# Patient Record
Sex: MALE | Race: ASIAN | NOT HISPANIC OR LATINO | Employment: OTHER
[De-identification: names, ages, dates, MRNs, and addresses within clinical notes are randomized per-mention and may not be internally consistent; named-entity substitution may affect disease eponyms.]

---

## 2017-02-03 ENCOUNTER — PRIOR ORIGINAL RECORDS (OUTPATIENT)
Dept: OTHER | Age: 72
End: 2017-02-03

## 2017-03-07 ENCOUNTER — HOSPITAL ENCOUNTER (OUTPATIENT)
Dept: CV DIAGNOSTICS | Facility: HOSPITAL | Age: 72
Discharge: HOME | End: 2017-03-07
Attending: INTERNAL MEDICINE
Payer: MEDICARE

## 2017-03-07 DIAGNOSIS — I35.0: ICD-10-CM

## 2017-03-07 DIAGNOSIS — R94.31 ABNORMAL EKG: ICD-10-CM

## 2017-03-07 PROCEDURE — 93306 TTE W/DOPPLER COMPLETE: CPT

## 2017-03-07 PROCEDURE — 93306 TTE W/DOPPLER COMPLETE: CPT | Performed by: INTERNAL MEDICINE

## 2017-03-08 ENCOUNTER — PRIOR ORIGINAL RECORDS (OUTPATIENT)
Dept: OTHER | Age: 72
End: 2017-03-08

## 2017-03-13 ENCOUNTER — PRIOR ORIGINAL RECORDS (OUTPATIENT)
Dept: OTHER | Age: 72
End: 2017-03-13

## 2017-03-14 ENCOUNTER — PRIOR ORIGINAL RECORDS (OUTPATIENT)
Dept: OTHER | Age: 72
End: 2017-03-14

## 2017-05-30 ENCOUNTER — LAB ENCOUNTER (OUTPATIENT)
Dept: LAB | Age: 72
End: 2017-05-30
Attending: GENERAL PRACTICE
Payer: MEDICARE

## 2017-05-30 ENCOUNTER — PRIOR ORIGINAL RECORDS (OUTPATIENT)
Dept: OTHER | Age: 72
End: 2017-05-30

## 2017-05-30 DIAGNOSIS — I10 HYPERTENSION: Primary | ICD-10-CM

## 2017-05-30 DIAGNOSIS — E78.5 DYSLIPIDEMIA: ICD-10-CM

## 2017-05-30 PROCEDURE — 36415 COLL VENOUS BLD VENIPUNCTURE: CPT

## 2017-05-30 PROCEDURE — 80061 LIPID PANEL: CPT

## 2017-05-30 PROCEDURE — 80053 COMPREHEN METABOLIC PANEL: CPT

## 2017-07-12 ENCOUNTER — HOSPITAL ENCOUNTER (OUTPATIENT)
Dept: NUCLEAR MEDICINE | Facility: HOSPITAL | Age: 72
Discharge: HOME | End: 2017-07-12
Attending: INTERNAL MEDICINE
Payer: MEDICARE

## 2017-07-12 ENCOUNTER — PRIOR ORIGINAL RECORDS (OUTPATIENT)
Dept: OTHER | Age: 72
End: 2017-07-12

## 2017-07-12 ENCOUNTER — HOSPITAL ENCOUNTER (OUTPATIENT)
Dept: CV DIAGNOSTICS | Facility: HOSPITAL | Age: 72
Discharge: HOME | End: 2017-07-12
Attending: INTERNAL MEDICINE
Payer: MEDICARE

## 2017-07-12 DIAGNOSIS — R07.9 CHEST PAIN, UNSPECIFIED TYPE: ICD-10-CM

## 2017-07-12 PROCEDURE — 93017 CV STRESS TEST TRACING ONLY: CPT | Performed by: INTERNAL MEDICINE

## 2017-07-12 PROCEDURE — 93018 CV STRESS TEST I&R ONLY: CPT | Performed by: INTERNAL MEDICINE

## 2017-07-12 PROCEDURE — 78452 HT MUSCLE IMAGE SPECT MULT: CPT | Performed by: INTERNAL MEDICINE

## 2017-07-12 PROCEDURE — 93016 CV STRESS TEST SUPVJ ONLY: CPT | Performed by: INTERNAL MEDICINE

## 2017-07-12 RX ORDER — 0.9 % SODIUM CHLORIDE 0.9 %
VIAL (ML) INJECTION
Status: COMPLETED
Start: 2017-07-12 | End: 2017-07-12

## 2017-07-12 RX ADMIN — 0.9 % SODIUM CHLORIDE 10 ML: 0.9 % VIAL (ML) INJECTION at 09:27:00

## 2017-07-12 NOTE — IMAGING NOTE
Patient here in cardiac diagnostics, outpatient lexiscan nuclear stress. Patient asymptomatic during test, no chest pain. Remains asymptomatic. Nuclear wet read requested due to ECG.  Per Dr. Ting Alvarez, nuclear images demonstrate small fixed apical perfusion d

## 2017-07-13 LAB
ALBUMIN: 3.6 G/DL
ALKALINE PHOSPHATATE(ALK PHOS): 47 IU/L
ALT (SGPT): 11 U/L
AST (SGOT): 18 U/L
BILIRUBIN TOTAL: 0.7 MG/DL
BUN: 14 MG/DL
CALCIUM: 9.1 MG/DL
CHLORIDE: 106 MEQ/L
CHOLESTEROL, TOTAL: 149 MG/DL
CREATININE, SERUM: 1.09 MG/DL
GLOBULIN: 4 G/DL
GLUCOSE: 94 MG/DL
GLUCOSE: 94 MG/DL
HDL CHOLESTEROL: 38 MG/DL
LDL CHOLESTEROL: 89 MG/DL
NON-HDL CHOLESTEROL: 111 MG/DL
POTASSIUM, SERUM: 3.8 MEQ/L
PROTEIN, TOTAL: 7.6 G/DL
SGOT (AST): 18 IU/L
SGPT (ALT): 11 IU/L
SODIUM: 140 MEQ/L
TRIGLYCERIDES: 111 MG/DL

## 2017-07-14 ENCOUNTER — PRIOR ORIGINAL RECORDS (OUTPATIENT)
Dept: OTHER | Age: 72
End: 2017-07-14

## 2017-07-14 ENCOUNTER — MYAURORA ACCOUNT LINK (OUTPATIENT)
Dept: OTHER | Age: 72
End: 2017-07-14

## 2017-09-30 ENCOUNTER — HOSPITAL ENCOUNTER (EMERGENCY)
Facility: HOSPITAL | Age: 72
Discharge: HOME | End: 2017-09-30
Attending: EMERGENCY MEDICINE
Payer: MEDICARE

## 2017-09-30 VITALS
DIASTOLIC BLOOD PRESSURE: 78 MMHG | RESPIRATION RATE: 20 BRPM | BODY MASS INDEX: 21.69 KG/M2 | HEART RATE: 76 BPM | SYSTOLIC BLOOD PRESSURE: 152 MMHG | TEMPERATURE: 98 F | OXYGEN SATURATION: 100 % | WEIGHT: 135 LBS | HEIGHT: 66 IN

## 2017-09-30 DIAGNOSIS — R03.0 ELEVATED BLOOD PRESSURE READING: ICD-10-CM

## 2017-09-30 DIAGNOSIS — Z65.8 SOCIAL DISCORD: Primary | ICD-10-CM

## 2017-09-30 PROCEDURE — 99283 EMERGENCY DEPT VISIT LOW MDM: CPT

## 2017-09-30 NOTE — ED PROVIDER NOTES
Patient Seen in: [facility] AND [facility] Emergency Department    History   Patient presents with:  Eval-P (psychiatric)    Stated Complaint: ALLEGED SUICIDAL     HPI    Relatively healthy 80-year-old male without psychiatric history who presents with EMS after Conjunctivae are normal. Pupils are equal, round, and reactive to light. Neck: Normal range of motion. Neck supple. Cardiovascular: Normal rate, regular rhythm and intact distal pulses. Pulmonary/Chest: Effort normal. No respiratory distress.    Diane Gilliam

## 2017-09-30 NOTE — ED NOTES
Patient states he made the comment as a joke to a  because he is under financial stress . States he would never kill himself. Family on the way. Chloe psych liason made aware of patient. Lunch tray ordered.

## 2018-01-23 ENCOUNTER — TELEPHONE (OUTPATIENT)
Dept: INTERNAL MEDICINE CLINIC | Facility: CLINIC | Age: 73
End: 2018-01-23

## 2018-01-23 NOTE — TELEPHONE ENCOUNTER
Called patient and left a message to call the office to schedule an appointment with Dr. Uriah Durán or Dr. Aileen Meek to establish care to continue getting refills since Dr. Gwendolyn Gold has retired.

## 2018-02-10 ENCOUNTER — LAB ENCOUNTER (OUTPATIENT)
Dept: LAB | Age: 73
End: 2018-02-10
Attending: INTERNAL MEDICINE
Payer: MEDICARE

## 2018-02-10 DIAGNOSIS — R53.83 FATIGUE, UNSPECIFIED TYPE: ICD-10-CM

## 2018-02-10 DIAGNOSIS — Z00.00 ROUTINE GENERAL MEDICAL EXAMINATION AT A HEALTH CARE FACILITY: Primary | ICD-10-CM

## 2018-02-10 DIAGNOSIS — Z12.5 PROSTATE CANCER SCREENING: ICD-10-CM

## 2018-02-10 LAB
ALBUMIN SERPL BCP-MCNC: 3.5 G/DL (ref 3.5–4.8)
ALBUMIN/GLOB SERPL: 0.9 {RATIO} (ref 1–2)
ALP SERPL-CCNC: 49 U/L (ref 32–100)
ALT SERPL-CCNC: 14 U/L (ref 17–63)
ANION GAP SERPL CALC-SCNC: 7 MMOL/L (ref 0–18)
AST SERPL-CCNC: 18 U/L (ref 15–41)
BASOPHILS # BLD: 0.1 K/UL (ref 0–0.2)
BASOPHILS NFR BLD: 1 %
BILIRUB SERPL-MCNC: 0.6 MG/DL (ref 0.3–1.2)
BUN SERPL-MCNC: 7 MG/DL (ref 8–20)
BUN/CREAT SERPL: 7.3 (ref 10–20)
CALCIUM SERPL-MCNC: 9.1 MG/DL (ref 8.5–10.5)
CHLORIDE SERPL-SCNC: 103 MMOL/L (ref 95–110)
CO2 SERPL-SCNC: 27 MMOL/L (ref 22–32)
CREAT SERPL-MCNC: 0.96 MG/DL (ref 0.5–1.5)
EOSINOPHIL # BLD: 0.1 K/UL (ref 0–0.7)
EOSINOPHIL NFR BLD: 1 %
ERYTHROCYTE [DISTWIDTH] IN BLOOD BY AUTOMATED COUNT: 13.8 % (ref 11–15)
GLOBULIN PLAS-MCNC: 4 G/DL (ref 2.5–3.7)
GLUCOSE SERPL-MCNC: 91 MG/DL (ref 70–99)
HCT VFR BLD AUTO: 45.7 % (ref 41–52)
HGB BLD-MCNC: 15 G/DL (ref 13.5–17.5)
LYMPHOCYTES # BLD: 2.4 K/UL (ref 1–4)
LYMPHOCYTES NFR BLD: 25 %
MCH RBC QN AUTO: 26.5 PG (ref 27–32)
MCHC RBC AUTO-ENTMCNC: 32.7 G/DL (ref 32–37)
MCV RBC AUTO: 80.9 FL (ref 80–100)
MONOCYTES # BLD: 0.6 K/UL (ref 0–1)
MONOCYTES NFR BLD: 6 %
NEUTROPHILS # BLD AUTO: 6.2 K/UL (ref 1.8–7.7)
NEUTROPHILS NFR BLD: 66 %
OSMOLALITY UR CALC.SUM OF ELEC: 282 MOSM/KG (ref 275–295)
PATIENT FASTING: YES
PLATELET # BLD AUTO: 256 K/UL (ref 140–400)
PMV BLD AUTO: 9.4 FL (ref 7.4–10.3)
POTASSIUM SERPL-SCNC: 4.5 MMOL/L (ref 3.3–5.1)
PROT SERPL-MCNC: 7.5 G/DL (ref 5.9–8.4)
PSA SERPL-MCNC: 2.9 NG/ML (ref 0–4)
RBC # BLD AUTO: 5.66 M/UL (ref 4.5–5.9)
SODIUM SERPL-SCNC: 137 MMOL/L (ref 136–144)
TSH SERPL-ACNC: 0.9 UIU/ML (ref 0.45–5.33)
WBC # BLD AUTO: 9.3 K/UL (ref 4–11)

## 2018-02-10 PROCEDURE — 84443 ASSAY THYROID STIM HORMONE: CPT

## 2018-02-10 PROCEDURE — 80053 COMPREHEN METABOLIC PANEL: CPT

## 2018-02-10 PROCEDURE — 85025 COMPLETE CBC W/AUTO DIFF WBC: CPT

## 2018-02-10 PROCEDURE — 36415 COLL VENOUS BLD VENIPUNCTURE: CPT

## 2018-03-24 ENCOUNTER — LAB ENCOUNTER (OUTPATIENT)
Dept: LAB | Age: 73
End: 2018-03-24
Attending: INTERNAL MEDICINE
Payer: MEDICARE

## 2018-03-24 DIAGNOSIS — I10 HTN (HYPERTENSION): ICD-10-CM

## 2018-03-24 DIAGNOSIS — E55.9 VITAMIN D DEFICIENCY: Primary | ICD-10-CM

## 2018-03-24 LAB
CHOLEST SERPL-MCNC: 157 MG/DL (ref 110–200)
HDLC SERPL-MCNC: 43 MG/DL
LDLC SERPL CALC-MCNC: 103 MG/DL (ref 0–99)
NONHDLC SERPL-MCNC: 114 MG/DL
TRIGL SERPL-MCNC: 56 MG/DL (ref 1–149)

## 2018-03-24 PROCEDURE — 80061 LIPID PANEL: CPT

## 2018-03-24 PROCEDURE — 36415 COLL VENOUS BLD VENIPUNCTURE: CPT

## 2018-03-24 PROCEDURE — 82306 VITAMIN D 25 HYDROXY: CPT

## 2018-03-26 LAB — 25(OH)D3 SERPL-MCNC: 34.6 NG/ML

## 2018-05-16 ENCOUNTER — APPOINTMENT (OUTPATIENT)
Dept: CT IMAGING | Facility: HOSPITAL | Age: 73
End: 2018-05-16
Attending: EMERGENCY MEDICINE
Payer: MEDICARE

## 2018-05-16 ENCOUNTER — HOSPITAL ENCOUNTER (EMERGENCY)
Facility: HOSPITAL | Age: 73
Discharge: HOME | End: 2018-05-16
Attending: EMERGENCY MEDICINE
Payer: MEDICARE

## 2018-05-16 VITALS
SYSTOLIC BLOOD PRESSURE: 150 MMHG | WEIGHT: 130 LBS | BODY MASS INDEX: 20.89 KG/M2 | HEART RATE: 70 BPM | OXYGEN SATURATION: 100 % | TEMPERATURE: 98 F | HEIGHT: 66 IN | RESPIRATION RATE: 18 BRPM | DIASTOLIC BLOOD PRESSURE: 96 MMHG

## 2018-05-16 DIAGNOSIS — R42 VERTIGO: Primary | ICD-10-CM

## 2018-05-16 PROCEDURE — 80048 BASIC METABOLIC PNL TOTAL CA: CPT | Performed by: EMERGENCY MEDICINE

## 2018-05-16 PROCEDURE — 36415 COLL VENOUS BLD VENIPUNCTURE: CPT

## 2018-05-16 PROCEDURE — 93010 ELECTROCARDIOGRAM REPORT: CPT | Performed by: EMERGENCY MEDICINE

## 2018-05-16 PROCEDURE — 99285 EMERGENCY DEPT VISIT HI MDM: CPT

## 2018-05-16 PROCEDURE — 93005 ELECTROCARDIOGRAM TRACING: CPT

## 2018-05-16 PROCEDURE — 70450 CT HEAD/BRAIN W/O DYE: CPT | Performed by: EMERGENCY MEDICINE

## 2018-05-16 PROCEDURE — 85025 COMPLETE CBC W/AUTO DIFF WBC: CPT | Performed by: EMERGENCY MEDICINE

## 2018-05-16 RX ORDER — MECLIZINE HYDROCHLORIDE 25 MG/1
25 TABLET ORAL 3 TIMES DAILY PRN
Qty: 30 TABLET | Refills: 0 | Status: SHIPPED | OUTPATIENT
Start: 2018-05-16

## 2018-05-16 RX ORDER — METOPROLOL SUCCINATE 25 MG/1
25 TABLET, EXTENDED RELEASE ORAL DAILY
COMMUNITY

## 2018-05-16 RX ORDER — MECLIZINE HYDROCHLORIDE 25 MG/1
25 TABLET ORAL ONCE
Status: COMPLETED | OUTPATIENT
Start: 2018-05-16 | End: 2018-05-16

## 2018-05-16 NOTE — ED PROVIDER NOTES
Patient Seen in: [facility] AND [facility] Emergency Department    History   Patient presents with:  Dizziness (neurologic)    Stated Complaint: dizzy/ palpatations    HPI    58-year-old male presents for complaint of dizziness.   Dizziness is a room spinning sen and well-nourished. HENT:   Head: Normocephalic and atraumatic.    Right Ear: Tympanic membrane normal.   Left Ear: Tympanic membrane normal.   Mouth/Throat: Uvula is midline, oropharynx is clear and moist and mucous membranes are normal.   Eyes: Conjunct 1507  ------------------------------------------------------------      MDM    EKG without any acute ischemic changes. Patient CBC and BMP with a leukocytosis but otherwise unremarkable. Patient is given meclizine. Head CT is pending.   Patient is endors

## 2018-05-16 NOTE — ED PROVIDER NOTES
Received in sign out from Dr. Dina Ng. Discussed with Dr. Jarrett Castaneda, original plan for admission for MRI and neuro consult.   Discussed this with the patient and his son at the bedside, patient does not want to be admitted, is willing to leave AGAINST MEDIC

## 2018-05-16 NOTE — ED NOTES
Pt verbalized understanding of leaving AMA and associated risks. Pt given d/c instructions and verbalized understanding of need to follow up.

## 2018-05-16 NOTE — ED INITIAL ASSESSMENT (HPI)
Pt reports some nausea, one episode of emesis and dizziness when he changes positions. Remote history of vertigo. Denies headache or head trauma.   States, \"I have to get up very slowly, and when I lie down I feel a little something that is not right in

## 2018-06-05 ENCOUNTER — HOSPITAL ENCOUNTER (OUTPATIENT)
Dept: MRI IMAGING | Facility: HOSPITAL | Age: 73
Discharge: HOME | End: 2018-06-05
Attending: INTERNAL MEDICINE
Payer: MEDICARE

## 2018-06-05 DIAGNOSIS — R42 DIZZINESS AND GIDDINESS: ICD-10-CM

## 2018-06-05 DIAGNOSIS — G46.3 BRAIN STEM STROKE SYNDROME: ICD-10-CM

## 2018-06-05 PROCEDURE — 70553 MRI BRAIN STEM W/O & W/DYE: CPT | Performed by: INTERNAL MEDICINE

## 2018-06-05 PROCEDURE — A9576 INJ PROHANCE MULTIPACK: HCPCS | Performed by: INTERNAL MEDICINE

## 2018-06-11 NOTE — H&P
[address] [facility] [address] Gastroenterology                                                                                                  Clinic History and Physical     Pa (Active prior to today's visit):    Current Outpatient Prescriptions:   AmLODIPine Besylate 5 MG Oral Tab  Disp:  Rfl: 2   lisinopril 20 MG Oral Tab  Disp:  Rfl: 0   PEG 3350-KCl-NaBcb-NaCl-NaSulf (COLYTE WITH FLAVOR PACKS) 240 g Oral Recon Soln As directed having movements of all 4 extremities   Psych: Pt has a normal mood and affect, behavior is normal    Nursing note and vitals reviewed    Labs/Imaging:     Patient's labs and imaging were reviewed and discussed with patient today.   See HPI and A&P for furt PACKS) 240 g Oral Recon Soln 1 Bottle 0      Sig: As directed per GI consult notes           Imaging & Referrals:  None       Radha Aguila, JANENE  6/11/2018

## 2018-06-19 ENCOUNTER — OFFICE VISIT (OUTPATIENT)
Dept: GASTROENTEROLOGY | Facility: CLINIC | Age: 73
End: 2018-06-19

## 2018-06-19 ENCOUNTER — TELEPHONE (OUTPATIENT)
Dept: GASTROENTEROLOGY | Facility: CLINIC | Age: 73
End: 2018-06-19

## 2018-06-19 VITALS
SYSTOLIC BLOOD PRESSURE: 121 MMHG | DIASTOLIC BLOOD PRESSURE: 70 MMHG | HEART RATE: 60 BPM | BODY MASS INDEX: 21.38 KG/M2 | WEIGHT: 133 LBS | HEIGHT: 66 IN

## 2018-06-19 DIAGNOSIS — Z12.11 SCREEN FOR COLON CANCER: Primary | ICD-10-CM

## 2018-06-19 DIAGNOSIS — Z12.11 SCREENING FOR COLON CANCER: Primary | ICD-10-CM

## 2018-06-19 PROCEDURE — 99203 OFFICE O/P NEW LOW 30 MIN: CPT | Performed by: NURSE PRACTITIONER

## 2018-06-19 PROCEDURE — G0463 HOSPITAL OUTPT CLINIC VISIT: HCPCS | Performed by: NURSE PRACTITIONER

## 2018-06-19 RX ORDER — AMLODIPINE BESYLATE 5 MG/1
TABLET ORAL
Refills: 2 | COMMUNITY
Start: 2018-05-02

## 2018-06-19 RX ORDER — LISINOPRIL 20 MG/1
TABLET ORAL
Refills: 0 | COMMUNITY
Start: 2018-06-11

## 2018-06-19 NOTE — PATIENT INSTRUCTIONS
1. Schedule colonoscopy with Dr. Shelia Watkins w/ IV Twilight     2.  bowel prep from pharmacy - split dose Colyte     3. Continue all medications for procedure     4. Read all bowel prep instructions carefully    5.  AVOID seeds, nuts, popcorn, raw f

## 2018-06-19 NOTE — TELEPHONE ENCOUNTER
Scheduled for:  Colonoscopy 61151  Provider Name: Dr. Stanley Nicolas  Date:  8/7/18  Location:  [facility]  Sedation:  IV  Time:  1:00 pm, arrival 12:00 pm  Prep: Colyte  Meds/Allergies Reconciled?:  JANENE Vincent reviewed  Diagnosis with codes:  Screening Z12.11  Was

## 2018-06-25 ENCOUNTER — TELEPHONE (OUTPATIENT)
Dept: GASTROENTEROLOGY | Facility: CLINIC | Age: 73
End: 2018-06-25

## 2018-06-25 NOTE — TELEPHONE ENCOUNTER
I spoke with this patient which he was looking to reschedule his procedure to an earlier date. I could not find anything sooner so he decided to keep the original date of 8/7/18.

## 2018-06-25 NOTE — TELEPHONE ENCOUNTER
Pt would like to reschedule 08/07/18 CLN to a sooner date please call thank you ok to leave message .

## 2018-08-07 ENCOUNTER — SURGERY (OUTPATIENT)
Age: 73
End: 2018-08-07

## 2018-08-07 ENCOUNTER — HOSPITAL ENCOUNTER (OUTPATIENT)
Facility: HOSPITAL | Age: 73
Setting detail: HOSPITAL OUTPATIENT SURGERY
Discharge: HOME | End: 2018-08-07
Attending: INTERNAL MEDICINE | Admitting: INTERNAL MEDICINE
Payer: MEDICARE

## 2018-08-07 DIAGNOSIS — Z12.11 SCREEN FOR COLON CANCER: ICD-10-CM

## 2018-08-07 PROCEDURE — G0500 MOD SEDAT ENDO SERVICE >5YRS: HCPCS | Performed by: INTERNAL MEDICINE

## 2018-08-07 PROCEDURE — 0DBH8ZX EXCISION OF CECUM, VIA NATURAL OR ARTIFICIAL OPENING ENDOSCOPIC, DIAGNOSTIC: ICD-10-PCS | Performed by: INTERNAL MEDICINE

## 2018-08-07 PROCEDURE — 45385 COLONOSCOPY W/LESION REMOVAL: CPT | Performed by: INTERNAL MEDICINE

## 2018-08-07 RX ORDER — SODIUM CHLORIDE, SODIUM LACTATE, POTASSIUM CHLORIDE, CALCIUM CHLORIDE 600; 310; 30; 20 MG/100ML; MG/100ML; MG/100ML; MG/100ML
INJECTION, SOLUTION INTRAVENOUS CONTINUOUS
Status: DISCONTINUED | OUTPATIENT
Start: 2018-08-07 | End: 2018-08-07

## 2018-08-07 RX ORDER — SODIUM CHLORIDE 0.9 % (FLUSH) 0.9 %
10 SYRINGE (ML) INJECTION AS NEEDED
Status: DISCONTINUED | OUTPATIENT
Start: 2018-08-07 | End: 2018-08-07

## 2018-08-07 RX ORDER — MIDAZOLAM HYDROCHLORIDE 1 MG/ML
1 INJECTION INTRAMUSCULAR; INTRAVENOUS EVERY 5 MIN PRN
Status: DISCONTINUED | OUTPATIENT
Start: 2018-08-07 | End: 2018-08-07

## 2018-08-07 RX ORDER — MIDAZOLAM HYDROCHLORIDE 1 MG/ML
INJECTION INTRAMUSCULAR; INTRAVENOUS
Status: DISCONTINUED | OUTPATIENT
Start: 2018-08-07 | End: 2018-08-07

## 2018-08-07 NOTE — H&P
History & Physical Examination    Patient Name: Yon Fernando  MRN: [id number]  CSN: [id number]  YOB: 1945    Diagnosis: Colorectal cancer screening        Prescriptions Prior to Admission:   AmLODIPine Besylate 5 MG Oral Tab  Disp:  Rfl: 2 8/ above.    Gladis MD Irene  8/7/2018  1:10 PM

## 2018-08-07 NOTE — OPERATIVE REPORT
[facility] Endoscopy Report      Date of Procedure:  08/07/18      Preoperative Diagnosis:  Colorectal cancer screening      Postoperative Diagnosis:  1. Small cecal polyp  2. Sigmoid colon diverticulosis  3.   Fair colonoscopic preparatio lesions, vascular anomalies or signs of inflammation seen. Retroflexion in the rectum revealed no abnormalities. The procedure was well tolerated without immediate complication. Impression:  1. Small cecal polyp  2.   Sigmoid colon diverticulosis  3

## 2018-08-08 ENCOUNTER — TELEPHONE (OUTPATIENT)
Dept: GASTROENTEROLOGY | Facility: CLINIC | Age: 73
End: 2018-08-08

## 2018-08-08 VITALS
SYSTOLIC BLOOD PRESSURE: 123 MMHG | DIASTOLIC BLOOD PRESSURE: 65 MMHG | HEIGHT: 66 IN | BODY MASS INDEX: 21.21 KG/M2 | HEART RATE: 44 BPM | WEIGHT: 132 LBS | RESPIRATION RATE: 16 BRPM | OXYGEN SATURATION: 99 %

## 2018-08-08 NOTE — TELEPHONE ENCOUNTER
I left a message on the patient's son's voicemail that I was returning the call with favorable results. I will contact him tomorrow.

## 2018-08-09 NOTE — TELEPHONE ENCOUNTER
I spoke to the patient's son. The patient is feeling well. The polyp was not adenomatous. It is of no consequence. We discussed the fair colonoscopic preparation.   I would consider either yearly stool Hemoccults and/or a repeat screening colonoscopy in

## 2018-12-23 ENCOUNTER — HOSPITAL ENCOUNTER (EMERGENCY)
Facility: HOSPITAL | Age: 73
Discharge: HOME | End: 2018-12-23
Attending: EMERGENCY MEDICINE
Payer: MEDICARE

## 2018-12-23 ENCOUNTER — APPOINTMENT (OUTPATIENT)
Dept: CT IMAGING | Facility: HOSPITAL | Age: 73
End: 2018-12-23
Attending: EMERGENCY MEDICINE
Payer: MEDICARE

## 2018-12-23 VITALS
HEIGHT: 66 IN | DIASTOLIC BLOOD PRESSURE: 76 MMHG | OXYGEN SATURATION: 98 % | BODY MASS INDEX: 21.69 KG/M2 | WEIGHT: 135 LBS | TEMPERATURE: 98 F | HEART RATE: 66 BPM | SYSTOLIC BLOOD PRESSURE: 123 MMHG | RESPIRATION RATE: 18 BRPM

## 2018-12-23 DIAGNOSIS — R42 VERTIGO: Primary | ICD-10-CM

## 2018-12-23 PROCEDURE — 70450 CT HEAD/BRAIN W/O DYE: CPT | Performed by: EMERGENCY MEDICINE

## 2018-12-23 PROCEDURE — 99284 EMERGENCY DEPT VISIT MOD MDM: CPT

## 2018-12-23 RX ORDER — MECLIZINE HYDROCHLORIDE 25 MG/1
25 TABLET ORAL EVERY 6 HOURS PRN
Qty: 20 TABLET | Refills: 0 | Status: SHIPPED | OUTPATIENT
Start: 2018-12-23

## 2018-12-23 NOTE — ED NOTES
Care assumed from triage. Pt presents with c/o dizziness, nausea, and posterior head pain with onset this morning. Pt states that he had breakfast at a restaurant this morning and began feeling \"out of focus\" and nauseated.  + vomit upon arrival to ED wit

## 2018-12-23 NOTE — ED INITIAL ASSESSMENT (HPI)
C/o posterior head pain with feeling like the walls are moving when he walks, also complains of nausea, denies chest pain/reilly, states he has had vertigo and it feels similar to that

## 2018-12-29 NOTE — ED PROVIDER NOTES
Patient Seen in: [facility] AND [facility] Emergency Department    History   Patient presents with:  Headache (neurologic)    Stated Complaint: dizziness/nausea    HPI    69 yo male with dizziness described as feeling off balance.  Has had vertigo in the past and tenderness. There is no rebound and no guarding. Musculoskeletal: Normal range of motion. He exhibits no edema or tenderness. Lymphadenopathy:     He has no cervical adenopathy. Neurological: He is alert and oriented to person, place, and time.  No cr

## 2019-02-28 VITALS
DIASTOLIC BLOOD PRESSURE: 60 MMHG | RESPIRATION RATE: 18 BRPM | WEIGHT: 127 LBS | OXYGEN SATURATION: 99 % | HEIGHT: 66 IN | BODY MASS INDEX: 20.41 KG/M2 | HEART RATE: 66 BPM | SYSTOLIC BLOOD PRESSURE: 118 MMHG

## 2019-03-01 VITALS
WEIGHT: 130 LBS | RESPIRATION RATE: 18 BRPM | BODY MASS INDEX: 20.89 KG/M2 | HEIGHT: 66 IN | OXYGEN SATURATION: 99 % | HEART RATE: 60 BPM | SYSTOLIC BLOOD PRESSURE: 108 MMHG | DIASTOLIC BLOOD PRESSURE: 58 MMHG

## 2019-10-23 ENCOUNTER — LAB ENCOUNTER (OUTPATIENT)
Dept: LAB | Age: 74
End: 2019-10-23
Attending: INTERNAL MEDICINE
Payer: MEDICARE

## 2019-10-23 DIAGNOSIS — R35.0 URINARY FREQUENCY: ICD-10-CM

## 2019-10-23 DIAGNOSIS — R53.83 FATIGUE: Primary | ICD-10-CM

## 2019-10-23 DIAGNOSIS — I10 HTN (HYPERTENSION): ICD-10-CM

## 2019-10-23 PROCEDURE — 36415 COLL VENOUS BLD VENIPUNCTURE: CPT

## 2019-10-23 PROCEDURE — 80048 BASIC METABOLIC PNL TOTAL CA: CPT

## 2019-10-23 PROCEDURE — 80061 LIPID PANEL: CPT

## 2019-10-23 PROCEDURE — 85025 COMPLETE CBC W/AUTO DIFF WBC: CPT

## 2019-10-23 PROCEDURE — 84443 ASSAY THYROID STIM HORMONE: CPT

## 2020-08-19 ENCOUNTER — LAB ENCOUNTER (OUTPATIENT)
Dept: LAB | Age: 75
End: 2020-08-19
Attending: INTERNAL MEDICINE
Payer: MEDICARE

## 2020-08-19 DIAGNOSIS — R35.0 URINARY FREQUENCY: ICD-10-CM

## 2020-08-19 DIAGNOSIS — E55.9 VITAMIN D DEFICIENCY: ICD-10-CM

## 2020-08-19 DIAGNOSIS — Z00.00 ROUTINE GENERAL MEDICAL EXAMINATION AT A HEALTH CARE FACILITY: ICD-10-CM

## 2020-08-19 DIAGNOSIS — I10 ESSENTIAL HYPERTENSION, BENIGN: ICD-10-CM

## 2020-08-19 LAB
ALBUMIN SERPL-MCNC: 3.1 G/DL (ref 3.4–5)
ALBUMIN/GLOB SERPL: 0.7 {RATIO} (ref 1–2)
ALP LIVER SERPL-CCNC: 57 U/L (ref 45–117)
ALT SERPL-CCNC: 17 U/L (ref 16–61)
ANION GAP SERPL CALC-SCNC: 4 MMOL/L (ref 0–18)
AST SERPL-CCNC: 17 U/L (ref 15–37)
BASOPHILS # BLD AUTO: 0.04 X10(3) UL (ref 0–0.2)
BASOPHILS NFR BLD AUTO: 0.4 %
BILIRUB SERPL-MCNC: 0.5 MG/DL (ref 0.1–2)
BUN BLD-MCNC: 15 MG/DL (ref 7–18)
BUN/CREAT SERPL: 14.2 (ref 10–20)
CALCIUM BLD-MCNC: 8.7 MG/DL (ref 8.5–10.1)
CHLORIDE SERPL-SCNC: 108 MMOL/L (ref 98–112)
CHOLEST SMN-MCNC: 173 MG/DL (ref ?–200)
CO2 SERPL-SCNC: 28 MMOL/L (ref 21–32)
CREAT BLD-MCNC: 1.06 MG/DL (ref 0.7–1.3)
DEPRECATED RDW RBC AUTO: 40.6 FL (ref 35.1–46.3)
EOSINOPHIL # BLD AUTO: 0.17 X10(3) UL (ref 0–0.7)
EOSINOPHIL NFR BLD AUTO: 1.7 %
ERYTHROCYTE [DISTWIDTH] IN BLOOD BY AUTOMATED COUNT: 13.2 % (ref 11–15)
GLOBULIN PLAS-MCNC: 4.5 G/DL (ref 2.8–4.4)
GLUCOSE BLD-MCNC: 82 MG/DL (ref 70–99)
HCT VFR BLD AUTO: 44.9 % (ref 39–53)
HDLC SERPL-MCNC: 45 MG/DL (ref 40–59)
HGB BLD-MCNC: 14.4 G/DL (ref 13–17.5)
IMM GRANULOCYTES # BLD AUTO: 0.05 X10(3) UL (ref 0–1)
IMM GRANULOCYTES NFR BLD: 0.5 %
LDLC SERPL CALC-MCNC: 106 MG/DL (ref ?–100)
LYMPHOCYTES # BLD AUTO: 2.18 X10(3) UL (ref 1–4)
LYMPHOCYTES NFR BLD AUTO: 21.2 %
M PROTEIN MFR SERPL ELPH: 7.6 G/DL (ref 6.4–8.2)
MCH RBC QN AUTO: 27.1 PG (ref 26–34)
MCHC RBC AUTO-ENTMCNC: 32.1 G/DL (ref 31–37)
MCV RBC AUTO: 84.6 FL (ref 80–100)
MONOCYTES # BLD AUTO: 0.56 X10(3) UL (ref 0.1–1)
MONOCYTES NFR BLD AUTO: 5.4 %
NEUTROPHILS # BLD AUTO: 7.29 X10 (3) UL (ref 1.5–7.7)
NEUTROPHILS # BLD AUTO: 7.29 X10(3) UL (ref 1.5–7.7)
NEUTROPHILS NFR BLD AUTO: 70.8 %
NONHDLC SERPL-MCNC: 128 MG/DL (ref ?–130)
OSMOLALITY SERPL CALC.SUM OF ELEC: 290 MOSM/KG (ref 275–295)
PATIENT FASTING Y/N/NP: YES
PATIENT FASTING Y/N/NP: YES
PLATELET # BLD AUTO: 246 10(3)UL (ref 150–450)
POTASSIUM SERPL-SCNC: 3.9 MMOL/L (ref 3.5–5.1)
PSA SERPL-MCNC: 2.28 NG/ML (ref ?–4)
RBC # BLD AUTO: 5.31 X10(6)UL (ref 3.8–5.8)
SODIUM SERPL-SCNC: 140 MMOL/L (ref 136–145)
TRIGL SERPL-MCNC: 112 MG/DL (ref 30–149)
VLDLC SERPL CALC-MCNC: 22 MG/DL (ref 0–30)
WBC # BLD AUTO: 10.3 X10(3) UL (ref 4–11)

## 2020-08-19 PROCEDURE — 82306 VITAMIN D 25 HYDROXY: CPT

## 2020-08-19 PROCEDURE — 85025 COMPLETE CBC W/AUTO DIFF WBC: CPT

## 2020-08-19 PROCEDURE — 80061 LIPID PANEL: CPT

## 2020-08-19 PROCEDURE — 80053 COMPREHEN METABOLIC PANEL: CPT

## 2020-08-19 PROCEDURE — 84153 ASSAY OF PSA TOTAL: CPT

## 2020-08-19 PROCEDURE — 36415 COLL VENOUS BLD VENIPUNCTURE: CPT

## 2020-08-21 LAB — 25(OH)D3 SERPL-MCNC: 38.3 NG/ML (ref 30–100)

## 2021-03-30 ENCOUNTER — EXTERNAL RECORD (OUTPATIENT)
Dept: HEALTH INFORMATION MANAGEMENT | Facility: OTHER | Age: 76
End: 2021-03-30

## 2021-03-30 ENCOUNTER — LAB ENCOUNTER (OUTPATIENT)
Dept: LAB | Age: 76
End: 2021-03-30
Attending: INTERNAL MEDICINE
Payer: MEDICARE

## 2021-03-30 PROCEDURE — 36415 COLL VENOUS BLD VENIPUNCTURE: CPT | Performed by: INTERNAL MEDICINE

## 2021-03-30 PROCEDURE — 80048 BASIC METABOLIC PNL TOTAL CA: CPT | Performed by: INTERNAL MEDICINE

## 2021-03-30 PROCEDURE — 85025 COMPLETE CBC W/AUTO DIFF WBC: CPT | Performed by: INTERNAL MEDICINE

## 2021-03-30 PROCEDURE — 84153 ASSAY OF PSA TOTAL: CPT | Performed by: INTERNAL MEDICINE

## 2021-03-30 PROCEDURE — 80061 LIPID PANEL: CPT | Performed by: INTERNAL MEDICINE

## 2021-06-03 ENCOUNTER — OFFICE VISIT (OUTPATIENT)
Dept: SURGERY | Facility: CLINIC | Age: 76
End: 2021-06-03
Payer: MEDICARE

## 2021-06-03 VITALS
WEIGHT: 135 LBS | BODY MASS INDEX: 21.69 KG/M2 | HEIGHT: 66 IN | DIASTOLIC BLOOD PRESSURE: 72 MMHG | HEART RATE: 65 BPM | RESPIRATION RATE: 16 BRPM | SYSTOLIC BLOOD PRESSURE: 115 MMHG

## 2021-06-03 DIAGNOSIS — R97.20 ELEVATED PSA: Primary | ICD-10-CM

## 2021-06-03 PROCEDURE — 3078F DIAST BP <80 MM HG: CPT | Performed by: UROLOGY

## 2021-06-03 PROCEDURE — 99204 OFFICE O/P NEW MOD 45 MIN: CPT | Performed by: UROLOGY

## 2021-06-03 PROCEDURE — 3074F SYST BP LT 130 MM HG: CPT | Performed by: UROLOGY

## 2021-06-03 PROCEDURE — 3008F BODY MASS INDEX DOCD: CPT | Performed by: UROLOGY

## 2021-06-03 RX ORDER — LISINOPRIL 2.5 MG/1
TABLET ORAL
COMMUNITY
Start: 2021-01-04

## 2021-06-03 RX ORDER — LATANOPROST 50 UG/ML
SOLUTION/ DROPS OPHTHALMIC
COMMUNITY
Start: 2021-04-04

## 2021-06-03 NOTE — PROGRESS NOTES
SUBJECTIVE:  Hollie Flores is a 77year old male who presents for a consultation at the request of, and a copy of this note will be sent to, Dr. Lyndsay Bang, for evaluation of  elevated PSA. He states that the problem is unchanged.  Symptoms include noted t indigestion, abdominal pains, bloody or tarry stools. GENERAL: Denies:  weight gain, weight loss, fever, night sweats, bone pain, malaise and fatigue.  Positive for:  None  All other review of systems reviewed and are otherwise negative    OBJECTIVE:  BP 1

## 2021-06-24 ENCOUNTER — EXTERNAL RECORD (OUTPATIENT)
Dept: HEALTH INFORMATION MANAGEMENT | Facility: OTHER | Age: 76
End: 2021-06-24

## 2021-07-14 ENCOUNTER — LAB ENCOUNTER (OUTPATIENT)
Dept: LAB | Facility: HOSPITAL | Age: 76
End: 2021-07-14
Attending: UROLOGY
Payer: MEDICARE

## 2021-07-14 LAB — PSA SERPL-MCNC: 3.58 NG/ML (ref ?–4)

## 2021-07-14 PROCEDURE — 36415 COLL VENOUS BLD VENIPUNCTURE: CPT | Performed by: UROLOGY

## 2021-07-14 PROCEDURE — 84153 ASSAY OF PSA TOTAL: CPT | Performed by: UROLOGY

## 2021-07-15 ENCOUNTER — OFFICE VISIT (OUTPATIENT)
Dept: SURGERY | Facility: CLINIC | Age: 76
End: 2021-07-15
Payer: MEDICARE

## 2021-07-15 ENCOUNTER — LAB ENCOUNTER (OUTPATIENT)
Dept: LAB | Facility: HOSPITAL | Age: 76
End: 2021-07-15
Attending: UROLOGY
Payer: MEDICARE

## 2021-07-15 VITALS
WEIGHT: 130 LBS | HEART RATE: 63 BPM | HEIGHT: 66 IN | RESPIRATION RATE: 16 BRPM | BODY MASS INDEX: 20.89 KG/M2 | DIASTOLIC BLOOD PRESSURE: 73 MMHG | SYSTOLIC BLOOD PRESSURE: 124 MMHG

## 2021-07-15 DIAGNOSIS — N13.8 BPH WITH OBSTRUCTION/LOWER URINARY TRACT SYMPTOMS: ICD-10-CM

## 2021-07-15 DIAGNOSIS — N40.1 BPH WITH OBSTRUCTION/LOWER URINARY TRACT SYMPTOMS: Primary | ICD-10-CM

## 2021-07-15 DIAGNOSIS — N40.1 BPH WITH OBSTRUCTION/LOWER URINARY TRACT SYMPTOMS: ICD-10-CM

## 2021-07-15 DIAGNOSIS — N13.8 BPH WITH OBSTRUCTION/LOWER URINARY TRACT SYMPTOMS: Primary | ICD-10-CM

## 2021-07-15 LAB
BILIRUB UR QL: NEGATIVE
CLARITY UR: CLEAR
COLOR UR: YELLOW
GLUCOSE UR-MCNC: NEGATIVE MG/DL
KETONES UR-MCNC: NEGATIVE MG/DL
NITRITE UR QL STRIP.AUTO: NEGATIVE
PH UR: 6 [PH] (ref 5–8)
PROT UR-MCNC: NEGATIVE MG/DL
SP GR UR STRIP: 1.01 (ref 1–1.03)
UROBILINOGEN UR STRIP-ACNC: <2

## 2021-07-15 PROCEDURE — 87086 URINE CULTURE/COLONY COUNT: CPT

## 2021-07-15 PROCEDURE — 3078F DIAST BP <80 MM HG: CPT | Performed by: UROLOGY

## 2021-07-15 PROCEDURE — 51798 US URINE CAPACITY MEASURE: CPT | Performed by: UROLOGY

## 2021-07-15 PROCEDURE — 51741 ELECTRO-UROFLOWMETRY FIRST: CPT | Performed by: UROLOGY

## 2021-07-15 PROCEDURE — 3008F BODY MASS INDEX DOCD: CPT | Performed by: UROLOGY

## 2021-07-15 PROCEDURE — 99213 OFFICE O/P EST LOW 20 MIN: CPT | Performed by: UROLOGY

## 2021-07-15 PROCEDURE — 3074F SYST BP LT 130 MM HG: CPT | Performed by: UROLOGY

## 2021-07-15 PROCEDURE — 81001 URINALYSIS AUTO W/SCOPE: CPT

## 2021-07-16 NOTE — PROGRESS NOTES
Yeni Mullins is a 77year old male.     HPI:   Patient presents with:  BPH: pt here today for f/u office visit for uroflow/us test for the tx of his BPH      71-year-old male presents in follow-up for uroflow and bladder scan for postvoid residual.    He (Patient not taking: Reported on 7/15/2021 )         Allergies:  No Known Allergies      ROS:       PHYSICAL EXAM:   Voided Volume: 157 mL    Maximum flow rate: 4.6 mL/s    Average flow rate: 4.5 mL/s    Voiding curve Shape: Flattened and prolonged    Blad

## 2021-10-05 ENCOUNTER — LAB ENCOUNTER (OUTPATIENT)
Dept: LAB | Facility: HOSPITAL | Age: 76
End: 2021-10-05
Attending: UROLOGY
Payer: MEDICARE

## 2021-10-05 ENCOUNTER — TELEPHONE (OUTPATIENT)
Dept: SURGERY | Facility: CLINIC | Age: 76
End: 2021-10-05

## 2021-10-05 ENCOUNTER — PROCEDURE (OUTPATIENT)
Dept: SURGERY | Facility: CLINIC | Age: 76
End: 2021-10-05
Payer: MEDICARE

## 2021-10-05 VITALS
WEIGHT: 130 LBS | DIASTOLIC BLOOD PRESSURE: 76 MMHG | RESPIRATION RATE: 16 BRPM | SYSTOLIC BLOOD PRESSURE: 122 MMHG | HEART RATE: 60 BPM | BODY MASS INDEX: 20.89 KG/M2 | HEIGHT: 66 IN

## 2021-10-05 DIAGNOSIS — N13.8 BPH WITH OBSTRUCTION/LOWER URINARY TRACT SYMPTOMS: Primary | ICD-10-CM

## 2021-10-05 DIAGNOSIS — Z01.818 PREOP EXAMINATION: ICD-10-CM

## 2021-10-05 DIAGNOSIS — N40.1 BPH WITH OBSTRUCTION/LOWER URINARY TRACT SYMPTOMS: Primary | ICD-10-CM

## 2021-10-05 DIAGNOSIS — Z01.818 PREOP EXAMINATION: Primary | ICD-10-CM

## 2021-10-05 DIAGNOSIS — N35.919 STRICTURE OF MALE URETHRA, UNSPECIFIED STRICTURE TYPE: ICD-10-CM

## 2021-10-05 LAB
ANION GAP SERPL CALC-SCNC: 5 MMOL/L (ref 0–18)
BASOPHILS # BLD AUTO: 0.05 X10(3) UL (ref 0–0.2)
BASOPHILS NFR BLD AUTO: 0.3 %
BUN BLD-MCNC: 13 MG/DL (ref 7–18)
BUN/CREAT SERPL: 13.5 (ref 10–20)
CALCIUM BLD-MCNC: 9.2 MG/DL (ref 8.5–10.1)
CHLORIDE SERPL-SCNC: 107 MMOL/L (ref 98–112)
CO2 SERPL-SCNC: 28 MMOL/L (ref 21–32)
CREAT BLD-MCNC: 0.96 MG/DL
DEPRECATED RDW RBC AUTO: 41.1 FL (ref 35.1–46.3)
EOSINOPHIL # BLD AUTO: 0.09 X10(3) UL (ref 0–0.7)
EOSINOPHIL NFR BLD AUTO: 0.6 %
ERYTHROCYTE [DISTWIDTH] IN BLOOD BY AUTOMATED COUNT: 13.2 % (ref 11–15)
GLUCOSE BLD-MCNC: 83 MG/DL (ref 70–99)
HCT VFR BLD AUTO: 44.2 %
HGB BLD-MCNC: 14.2 G/DL
IMM GRANULOCYTES # BLD AUTO: 0.06 X10(3) UL (ref 0–1)
IMM GRANULOCYTES NFR BLD: 0.4 %
LYMPHOCYTES # BLD AUTO: 2.39 X10(3) UL (ref 1–4)
LYMPHOCYTES NFR BLD AUTO: 16.5 %
MCH RBC QN AUTO: 27 PG (ref 26–34)
MCHC RBC AUTO-ENTMCNC: 32.1 G/DL (ref 31–37)
MCV RBC AUTO: 84.2 FL
MONOCYTES # BLD AUTO: 0.91 X10(3) UL (ref 0.1–1)
MONOCYTES NFR BLD AUTO: 6.3 %
NEUTROPHILS # BLD AUTO: 10.99 X10 (3) UL (ref 1.5–7.7)
NEUTROPHILS # BLD AUTO: 10.99 X10(3) UL (ref 1.5–7.7)
NEUTROPHILS NFR BLD AUTO: 75.9 %
OSMOLALITY SERPL CALC.SUM OF ELEC: 289 MOSM/KG (ref 275–295)
PATIENT FASTING Y/N/NP: NO
PLATELET # BLD AUTO: 228 10(3)UL (ref 150–450)
POTASSIUM SERPL-SCNC: 4.2 MMOL/L (ref 3.5–5.1)
RBC # BLD AUTO: 5.25 X10(6)UL
SODIUM SERPL-SCNC: 140 MMOL/L (ref 136–145)
WBC # BLD AUTO: 14.5 X10(3) UL (ref 4–11)

## 2021-10-05 PROCEDURE — 99213 OFFICE O/P EST LOW 20 MIN: CPT | Performed by: UROLOGY

## 2021-10-05 PROCEDURE — 3008F BODY MASS INDEX DOCD: CPT | Performed by: UROLOGY

## 2021-10-05 PROCEDURE — 85025 COMPLETE CBC W/AUTO DIFF WBC: CPT

## 2021-10-05 PROCEDURE — 80048 BASIC METABOLIC PNL TOTAL CA: CPT

## 2021-10-05 PROCEDURE — 3078F DIAST BP <80 MM HG: CPT | Performed by: UROLOGY

## 2021-10-05 PROCEDURE — 3074F SYST BP LT 130 MM HG: CPT | Performed by: UROLOGY

## 2021-10-05 PROCEDURE — 36415 COLL VENOUS BLD VENIPUNCTURE: CPT

## 2021-10-05 PROCEDURE — 52000 CYSTOURETHROSCOPY: CPT | Performed by: UROLOGY

## 2021-10-05 RX ORDER — CIPROFLOXACIN 500 MG/1
500 TABLET, FILM COATED ORAL ONCE
Status: COMPLETED | OUTPATIENT
Start: 2021-10-05 | End: 2021-10-05

## 2021-10-05 RX ORDER — ATORVASTATIN CALCIUM 10 MG/1
TABLET, FILM COATED ORAL
COMMUNITY
Start: 2021-08-11

## 2021-10-05 RX ORDER — ERGOCALCIFEROL 1.25 MG/1
CAPSULE ORAL
COMMUNITY
Start: 2021-09-17

## 2021-10-05 RX ADMIN — CIPROFLOXACIN 500 MG: 500 TABLET, FILM COATED ORAL at 13:11:00

## 2021-10-05 NOTE — PROGRESS NOTES
Patient seen in office, scheduled cystoscopy, holmium laser incision of urethral stricture, Wednesday 10/27/2021, [facility]/outpatient, went over pre-op instructions, labs/urine done today.

## 2021-10-05 NOTE — TELEPHONE ENCOUNTER
This encounter is now closed. \"Per aCrolin Malin sent pt down for labs and the order is still pending. Per Carolin Navarrete does Dr. Laura Malin want the labs done today. Per Carolin Navarrete pt is there and asking to please call to let her know.  Thank you\"

## 2021-10-05 NOTE — PROGRESS NOTES
Shayla Astorga is a 77year old male. HPI:   Patient presents with:  Urethral Stricture: cystoscopy       45-year-old male in follow-up to visit July 15, 2021. He underwent uroflow bladder scan for postvoid residual at that time.   Has a history of uret 10/5/2021) 30 tablet 0   • Lisinopril-Hydrochlorothiazide 10-12.5 MG Oral Tab Take 1 tablet by mouth daily.  (Patient not taking: Reported on 10/5/2021)         Allergies:  No Known Allergies      ROS:       PHYSICAL EXAM:   Jian Bowen  : 3/6/1945  R

## 2021-10-05 NOTE — TELEPHONE ENCOUNTER
Abdirashid eWlch sent pt down for labs and the order is still pending. Abdirashid Garcia does Dr. Jocy Welch want the labs done today.  Abdirashid Garcia pt is there and asking to please call to let her know thank you

## 2021-10-21 ENCOUNTER — TELEPHONE (OUTPATIENT)
Dept: SURGERY | Facility: CLINIC | Age: 76
End: 2021-10-21

## 2021-10-21 DIAGNOSIS — N35.919 STRICTURE OF MALE URETHRA, UNSPECIFIED STRICTURE TYPE: Primary | ICD-10-CM

## 2022-05-14 ENCOUNTER — LAB ENCOUNTER (OUTPATIENT)
Dept: LAB | Facility: HOSPITAL | Age: 77
End: 2022-05-14
Attending: INTERNAL MEDICINE
Payer: MEDICARE

## 2022-05-14 DIAGNOSIS — R35.0 URINARY FREQUENCY: ICD-10-CM

## 2022-05-14 DIAGNOSIS — Z00.00 ROUTINE GENERAL MEDICAL EXAMINATION AT A HEALTH CARE FACILITY: Primary | ICD-10-CM

## 2022-05-14 DIAGNOSIS — R53.83 FATIGUE: ICD-10-CM

## 2022-05-14 DIAGNOSIS — E55.9 VITAMIN D DEFICIENCY: ICD-10-CM

## 2022-05-14 LAB
BASOPHILS # BLD AUTO: 0.04 X10(3) UL (ref 0–0.2)
BASOPHILS NFR BLD AUTO: 0.4 %
CHOLEST SERPL-MCNC: 122 MG/DL (ref ?–200)
COMPLEXED PSA SERPL-MCNC: 2.25 NG/ML (ref ?–4)
DEPRECATED RDW RBC AUTO: 41 FL (ref 35.1–46.3)
EOSINOPHIL # BLD AUTO: 0.1 X10(3) UL (ref 0–0.7)
EOSINOPHIL NFR BLD AUTO: 0.9 %
ERYTHROCYTE [DISTWIDTH] IN BLOOD BY AUTOMATED COUNT: 13.2 % (ref 11–15)
FASTING PATIENT LIPID ANSWER: YES
HCT VFR BLD AUTO: 44.1 %
HDLC SERPL-MCNC: 49 MG/DL (ref 40–59)
HGB BLD-MCNC: 13.9 G/DL
IMM GRANULOCYTES # BLD AUTO: 0.05 X10(3) UL (ref 0–1)
IMM GRANULOCYTES NFR BLD: 0.5 %
LDLC SERPL CALC-MCNC: 60 MG/DL (ref ?–100)
LYMPHOCYTES # BLD AUTO: 2.05 X10(3) UL (ref 1–4)
LYMPHOCYTES NFR BLD AUTO: 19.2 %
MCH RBC QN AUTO: 27 PG (ref 26–34)
MCHC RBC AUTO-ENTMCNC: 31.5 G/DL (ref 31–37)
MCV RBC AUTO: 85.6 FL
MONOCYTES # BLD AUTO: 0.58 X10(3) UL (ref 0.1–1)
MONOCYTES NFR BLD AUTO: 5.4 %
NEUTROPHILS # BLD AUTO: 7.88 X10 (3) UL (ref 1.5–7.7)
NEUTROPHILS # BLD AUTO: 7.88 X10(3) UL (ref 1.5–7.7)
NEUTROPHILS NFR BLD AUTO: 73.6 %
NONHDLC SERPL-MCNC: 73 MG/DL (ref ?–130)
PLATELET # BLD AUTO: 242 10(3)UL (ref 150–450)
RBC # BLD AUTO: 5.15 X10(6)UL
TRIGL SERPL-MCNC: 58 MG/DL (ref 30–149)
TSI SER-ACNC: 1.58 MIU/ML (ref 0.36–3.74)
VIT D+METAB SERPL-MCNC: 70 NG/ML (ref 30–100)
VLDLC SERPL CALC-MCNC: 9 MG/DL (ref 0–30)
WBC # BLD AUTO: 10.7 X10(3) UL (ref 4–11)

## 2022-05-14 PROCEDURE — 84443 ASSAY THYROID STIM HORMONE: CPT

## 2022-05-14 PROCEDURE — 80061 LIPID PANEL: CPT

## 2022-05-14 PROCEDURE — 80053 COMPREHEN METABOLIC PANEL: CPT | Performed by: INTERNAL MEDICINE

## 2022-05-14 PROCEDURE — 36415 COLL VENOUS BLD VENIPUNCTURE: CPT

## 2022-05-14 PROCEDURE — 85025 COMPLETE CBC W/AUTO DIFF WBC: CPT

## 2022-05-14 PROCEDURE — 82306 VITAMIN D 25 HYDROXY: CPT

## 2022-05-23 ENCOUNTER — PRIOR ORIGINAL RECORDS (OUTPATIENT)
Dept: HEALTH INFORMATION MANAGEMENT | Facility: OTHER | Age: 77
End: 2022-05-23

## 2022-08-05 ENCOUNTER — TELEPHONE (OUTPATIENT)
Dept: CARDIOLOGY | Age: 77
End: 2022-08-05

## 2022-08-08 RX ORDER — ATORVASTATIN CALCIUM 10 MG/1
10 TABLET, FILM COATED ORAL DAILY
Qty: 90 TABLET | Refills: 1 | Status: SHIPPED | OUTPATIENT
Start: 2022-08-08 | End: 2023-06-08 | Stop reason: SDUPTHER

## 2022-10-21 ENCOUNTER — TELEPHONE (OUTPATIENT)
Dept: CARDIOLOGY | Age: 77
End: 2022-10-21

## 2023-02-17 ENCOUNTER — LAB ENCOUNTER (OUTPATIENT)
Dept: LAB | Age: 78
End: 2023-02-17
Attending: INTERNAL MEDICINE
Payer: MEDICARE

## 2023-02-17 DIAGNOSIS — N39.0 UTI (URINARY TRACT INFECTION): ICD-10-CM

## 2023-02-17 DIAGNOSIS — R30.0 DYSURIA: Primary | ICD-10-CM

## 2023-02-17 LAB
ANION GAP SERPL CALC-SCNC: 7 MMOL/L (ref 0–18)
BASOPHILS # BLD AUTO: 0.06 X10(3) UL (ref 0–0.2)
BASOPHILS NFR BLD AUTO: 0.4 %
BILIRUB UR QL: NEGATIVE
BUN BLD-MCNC: 15 MG/DL (ref 7–18)
BUN/CREAT SERPL: 14 (ref 10–20)
CALCIUM BLD-MCNC: 9.2 MG/DL (ref 8.5–10.1)
CHLORIDE SERPL-SCNC: 105 MMOL/L (ref 98–112)
CO2 SERPL-SCNC: 29 MMOL/L (ref 21–32)
COLOR UR: YELLOW
COMPLEXED PSA SERPL-MCNC: 8.83 NG/ML (ref ?–4)
CREAT BLD-MCNC: 1.07 MG/DL
DEPRECATED RDW RBC AUTO: 41.8 FL (ref 35.1–46.3)
EOSINOPHIL # BLD AUTO: 0.13 X10(3) UL (ref 0–0.7)
EOSINOPHIL NFR BLD AUTO: 0.9 %
ERYTHROCYTE [DISTWIDTH] IN BLOOD BY AUTOMATED COUNT: 13.5 % (ref 11–15)
EST. AVERAGE GLUCOSE BLD GHB EST-MCNC: 105 MG/DL (ref 68–126)
FASTING STATUS PATIENT QL REPORTED: NO
GFR SERPLBLD BASED ON 1.73 SQ M-ARVRAT: 72 ML/MIN/1.73M2 (ref 60–?)
GLUCOSE BLD-MCNC: 70 MG/DL (ref 70–99)
GLUCOSE UR-MCNC: NEGATIVE MG/DL
HBA1C MFR BLD: 5.3 % (ref ?–5.7)
HCT VFR BLD AUTO: 47.3 %
HGB BLD-MCNC: 14.5 G/DL
IMM GRANULOCYTES # BLD AUTO: 0.06 X10(3) UL (ref 0–1)
IMM GRANULOCYTES NFR BLD: 0.4 %
KETONES UR-MCNC: NEGATIVE MG/DL
LYMPHOCYTES # BLD AUTO: 2.49 X10(3) UL (ref 1–4)
LYMPHOCYTES NFR BLD AUTO: 18.1 %
MCH RBC QN AUTO: 25.9 PG (ref 26–34)
MCHC RBC AUTO-ENTMCNC: 30.7 G/DL (ref 31–37)
MCV RBC AUTO: 84.5 FL
MONOCYTES # BLD AUTO: 0.81 X10(3) UL (ref 0.1–1)
MONOCYTES NFR BLD AUTO: 5.9 %
NEUTROPHILS # BLD AUTO: 10.23 X10 (3) UL (ref 1.5–7.7)
NEUTROPHILS # BLD AUTO: 10.23 X10(3) UL (ref 1.5–7.7)
NEUTROPHILS NFR BLD AUTO: 74.3 %
NITRITE UR QL STRIP.AUTO: NEGATIVE
OSMOLALITY SERPL CALC.SUM OF ELEC: 291 MOSM/KG (ref 275–295)
PH UR: 6 [PH] (ref 5–8)
PLATELET # BLD AUTO: 303 10(3)UL (ref 150–450)
POTASSIUM SERPL-SCNC: 4 MMOL/L (ref 3.5–5.1)
PROT UR-MCNC: 30 MG/DL
RBC # BLD AUTO: 5.6 X10(6)UL
SODIUM SERPL-SCNC: 141 MMOL/L (ref 136–145)
SP GR UR STRIP: 1.01 (ref 1–1.03)
UROBILINOGEN UR STRIP-ACNC: <2
VIT C UR-MCNC: NEGATIVE MG/DL
WBC # BLD AUTO: 13.8 X10(3) UL (ref 4–11)
WBC #/AREA URNS AUTO: >50 /HPF

## 2023-02-17 PROCEDURE — 83036 HEMOGLOBIN GLYCOSYLATED A1C: CPT

## 2023-02-17 PROCEDURE — 81001 URINALYSIS AUTO W/SCOPE: CPT

## 2023-02-17 PROCEDURE — 36415 COLL VENOUS BLD VENIPUNCTURE: CPT

## 2023-02-17 PROCEDURE — 85025 COMPLETE CBC W/AUTO DIFF WBC: CPT

## 2023-02-17 PROCEDURE — 87086 URINE CULTURE/COLONY COUNT: CPT

## 2023-02-17 PROCEDURE — 80048 BASIC METABOLIC PNL TOTAL CA: CPT

## 2023-05-26 PROBLEM — I10 HYPERTENSION: Status: ACTIVE | Noted: 2023-05-26

## 2023-05-26 PROBLEM — E78.5 DYSLIPIDEMIA: Status: ACTIVE | Noted: 2023-05-26

## 2023-05-26 PROBLEM — I35.0 AORTIC STENOSIS: Status: ACTIVE | Noted: 2023-05-26

## 2023-05-26 RX ORDER — AMLODIPINE BESYLATE 5 MG/1
5 TABLET ORAL
COMMUNITY
Start: 2017-02-02 | End: 2023-06-08

## 2023-06-01 ENCOUNTER — TELEPHONE (OUTPATIENT)
Dept: GASTROENTEROLOGY | Facility: CLINIC | Age: 78
End: 2023-06-01

## 2023-06-01 ENCOUNTER — APPOINTMENT (OUTPATIENT)
Dept: CARDIOLOGY | Age: 78
End: 2023-06-01

## 2023-06-01 NOTE — TELEPHONE ENCOUNTER
----- Message from Teresa Mir RN sent at 8/9/2018  5:01 PM CDT -----  Regarding: Recall colon in 5 years per Dr. Simon Dorsey. Recall colon in 5 years per Dr. Simon Dorsey.  Colon done 8/7/18

## 2023-06-08 ENCOUNTER — OFFICE VISIT (OUTPATIENT)
Dept: CARDIOLOGY | Age: 78
End: 2023-06-08

## 2023-06-08 VITALS
BODY MASS INDEX: 21.19 KG/M2 | HEIGHT: 66 IN | SYSTOLIC BLOOD PRESSURE: 115 MMHG | DIASTOLIC BLOOD PRESSURE: 70 MMHG | WEIGHT: 131.84 LBS | HEART RATE: 75 BPM

## 2023-06-08 DIAGNOSIS — I35.0 AORTIC VALVE STENOSIS, ETIOLOGY OF CARDIAC VALVE DISEASE UNSPECIFIED: ICD-10-CM

## 2023-06-08 DIAGNOSIS — E78.5 DYSLIPIDEMIA: ICD-10-CM

## 2023-06-08 DIAGNOSIS — I10 HYPERTENSION, UNSPECIFIED TYPE: Primary | ICD-10-CM

## 2023-06-08 PROCEDURE — 3078F DIAST BP <80 MM HG: CPT | Performed by: SPECIALIST

## 2023-06-08 PROCEDURE — 99214 OFFICE O/P EST MOD 30 MIN: CPT | Performed by: SPECIALIST

## 2023-06-08 PROCEDURE — 3074F SYST BP LT 130 MM HG: CPT | Performed by: SPECIALIST

## 2023-06-08 RX ORDER — TAMSULOSIN HYDROCHLORIDE 0.4 MG/1
0.4 CAPSULE ORAL DAILY
COMMUNITY
Start: 2023-05-22

## 2023-06-08 RX ORDER — ATORVASTATIN CALCIUM 10 MG/1
10 TABLET, FILM COATED ORAL DAILY
Qty: 90 TABLET | Refills: 1 | Status: SHIPPED | OUTPATIENT
Start: 2023-06-08

## 2023-06-08 SDOH — HEALTH STABILITY: PHYSICAL HEALTH: ON AVERAGE, HOW MANY MINUTES DO YOU ENGAGE IN EXERCISE AT THIS LEVEL?: 0 MIN

## 2023-06-08 SDOH — HEALTH STABILITY: PHYSICAL HEALTH: ON AVERAGE, HOW MANY DAYS PER WEEK DO YOU ENGAGE IN MODERATE TO STRENUOUS EXERCISE (LIKE A BRISK WALK)?: 0 DAYS

## 2023-06-08 ASSESSMENT — PATIENT HEALTH QUESTIONNAIRE - PHQ9
2. FEELING DOWN, DEPRESSED OR HOPELESS: NOT AT ALL
CLINICAL INTERPRETATION OF PHQ2 SCORE: NO FURTHER SCREENING NEEDED
SUM OF ALL RESPONSES TO PHQ9 QUESTIONS 1 AND 2: 0
1. LITTLE INTEREST OR PLEASURE IN DOING THINGS: NOT AT ALL
SUM OF ALL RESPONSES TO PHQ9 QUESTIONS 1 AND 2: 0

## 2023-06-16 ENCOUNTER — EXTERNAL LAB (OUTPATIENT)
Dept: CARDIOLOGY | Age: 78
End: 2023-06-16

## 2023-06-16 LAB
ALT SERPL-CCNC: 9 U/L (ref 10–49)
AST SERPL-CCNC: 14 U/L
CHOLEST SERPL-MCNC: 134 MG/DL
HDLC SERPL-MCNC: 39 MG/DL
LDLC SERPL CALC-MCNC: 81 MG/DL (ref 0–129)
LENGTH OF FAST TIME PATIENT: ABNORMAL H
TRIGL SERPL-MCNC: 72 MG/DL
VLDLC SERPL CALC-MCNC: 14 MG/DL

## 2023-06-20 ENCOUNTER — TELEPHONE (OUTPATIENT)
Dept: CARDIOLOGY | Age: 78
End: 2023-06-20

## 2023-06-21 ENCOUNTER — TELEPHONE (OUTPATIENT)
Dept: CARDIOLOGY | Age: 78
End: 2023-06-21

## 2023-06-24 ENCOUNTER — EXTERNAL RECORD (OUTPATIENT)
Dept: HEALTH INFORMATION MANAGEMENT | Facility: OTHER | Age: 78
End: 2023-06-24

## 2023-06-30 ENCOUNTER — TELEPHONE (OUTPATIENT)
Dept: CARDIOLOGY | Age: 78
End: 2023-06-30

## 2023-06-30 ENCOUNTER — PREP FOR CASE (OUTPATIENT)
Dept: CARDIOLOGY | Age: 78
End: 2023-06-30

## 2023-06-30 DIAGNOSIS — E78.5 DYSLIPIDEMIA: ICD-10-CM

## 2023-06-30 DIAGNOSIS — I10 HYPERTENSION, UNSPECIFIED TYPE: ICD-10-CM

## 2023-06-30 DIAGNOSIS — R94.39 ABNORMAL RESULT OF OTHER CARDIOVASCULAR FUNCTION STUDY: ICD-10-CM

## 2023-06-30 DIAGNOSIS — Z01.812 PRE-PROCEDURE LAB EXAM: Primary | ICD-10-CM

## 2023-06-30 DIAGNOSIS — I35.0 AORTIC VALVE STENOSIS, ETIOLOGY OF CARDIAC VALVE DISEASE UNSPECIFIED: ICD-10-CM

## 2023-06-30 RX ORDER — 0.9 % SODIUM CHLORIDE 0.9 %
2 VIAL (ML) INJECTION EVERY 12 HOURS SCHEDULED
Status: CANCELLED | OUTPATIENT
Start: 2023-06-30

## 2023-06-30 RX ORDER — CLONIDINE HYDROCHLORIDE 0.1 MG/1
0.1 TABLET ORAL EVERY 4 HOURS PRN
Status: CANCELLED | OUTPATIENT
Start: 2023-06-30 | End: 2023-07-01

## 2023-06-30 RX ORDER — ASPIRIN 325 MG
325 TABLET ORAL ONCE
Status: CANCELLED | OUTPATIENT
Start: 2023-06-30 | End: 2023-06-30

## 2023-06-30 RX ORDER — SODIUM CHLORIDE 9 MG/ML
INJECTION, SOLUTION INTRAVENOUS CONTINUOUS
Status: CANCELLED | OUTPATIENT
Start: 2023-06-30 | End: 2023-06-30

## 2023-06-30 RX ORDER — HYDRALAZINE HYDROCHLORIDE 20 MG/ML
10 INJECTION INTRAMUSCULAR; INTRAVENOUS EVERY 4 HOURS PRN
Status: CANCELLED | OUTPATIENT
Start: 2023-06-30 | End: 2023-07-01

## 2023-06-30 RX ORDER — MULTIVITAMIN
1 TABLET ORAL DAILY
COMMUNITY

## 2023-07-01 ENCOUNTER — LAB SERVICES (OUTPATIENT)
Dept: LAB | Age: 78
End: 2023-07-01

## 2023-07-01 DIAGNOSIS — I10 HYPERTENSION, UNSPECIFIED TYPE: ICD-10-CM

## 2023-07-01 DIAGNOSIS — E78.5 DYSLIPIDEMIA: ICD-10-CM

## 2023-07-01 DIAGNOSIS — Z01.812 PRE-PROCEDURE LAB EXAM: ICD-10-CM

## 2023-07-01 DIAGNOSIS — I35.0 AORTIC VALVE STENOSIS, ETIOLOGY OF CARDIAC VALVE DISEASE UNSPECIFIED: ICD-10-CM

## 2023-07-01 LAB
ALT SERPL-CCNC: 13 UNITS/L
ANION GAP SERPL CALC-SCNC: 7 MMOL/L (ref 7–19)
AST SERPL-CCNC: 9 UNITS/L
BASOPHILS # BLD: 0 K/MCL (ref 0–0.3)
BASOPHILS NFR BLD: 0 %
BUN SERPL-MCNC: 18 MG/DL (ref 6–20)
BUN/CREAT SERPL: 17 (ref 7–25)
CALCIUM SERPL-MCNC: 8.8 MG/DL (ref 8.4–10.2)
CHLORIDE SERPL-SCNC: 109 MMOL/L (ref 97–110)
CHOLEST SERPL-MCNC: 122 MG/DL
CHOLEST/HDLC SERPL: 2.4 {RATIO}
CO2 SERPL-SCNC: 27 MMOL/L (ref 21–32)
CREAT SERPL-MCNC: 1.04 MG/DL (ref 0.67–1.17)
DEPRECATED RDW RBC: 42 FL (ref 39–50)
EOSINOPHIL # BLD: 0 K/MCL (ref 0–0.5)
EOSINOPHIL NFR BLD: 0 %
ERYTHROCYTE [DISTWIDTH] IN BLOOD: 13.7 % (ref 11–15)
FASTING DURATION TIME PATIENT: 0 HOURS (ref 0–999)
GFR SERPLBLD BASED ON 1.73 SQ M-ARVRAT: 74 ML/MIN
GLUCOSE SERPL-MCNC: 116 MG/DL (ref 70–99)
HCT VFR BLD CALC: 43.6 % (ref 39–51)
HDLC SERPL-MCNC: 50 MG/DL
HGB BLD-MCNC: 13.6 G/DL (ref 13–17)
IMM GRANULOCYTES # BLD AUTO: 0 K/MCL (ref 0–0.2)
IMM GRANULOCYTES # BLD: 0 %
INR PPP: 1.1
LDLC SERPL CALC-MCNC: 58 MG/DL
LYMPHOCYTES # BLD: 1.7 K/MCL (ref 1–4)
LYMPHOCYTES NFR BLD: 18 %
MCH RBC QN AUTO: 26.1 PG (ref 26–34)
MCHC RBC AUTO-ENTMCNC: 31.2 G/DL (ref 32–36.5)
MCV RBC AUTO: 83.7 FL (ref 78–100)
MONOCYTES # BLD: 0.5 K/MCL (ref 0.3–0.9)
MONOCYTES NFR BLD: 5 %
NEUTROPHILS # BLD: 7 K/MCL (ref 1.8–7.7)
NEUTROPHILS NFR BLD: 77 %
NONHDLC SERPL-MCNC: 72 MG/DL
NRBC BLD MANUAL-RTO: 0 /100 WBC
PLATELET # BLD AUTO: 201 K/MCL (ref 140–450)
POTASSIUM SERPL-SCNC: 4 MMOL/L (ref 3.4–5.1)
PROTHROMBIN TIME: 10.9 SEC (ref 9.7–11.8)
RBC # BLD: 5.21 MIL/MCL (ref 4.5–5.9)
SODIUM SERPL-SCNC: 139 MMOL/L (ref 135–145)
TRIGL SERPL-MCNC: 72 MG/DL
WBC # BLD: 9.3 K/MCL (ref 4.2–11)

## 2023-07-01 PROCEDURE — 80061 LIPID PANEL: CPT | Performed by: INTERNAL MEDICINE

## 2023-07-01 PROCEDURE — 85610 PROTHROMBIN TIME: CPT | Performed by: INTERNAL MEDICINE

## 2023-07-01 PROCEDURE — 85025 COMPLETE CBC W/AUTO DIFF WBC: CPT | Performed by: INTERNAL MEDICINE

## 2023-07-01 PROCEDURE — 80048 BASIC METABOLIC PNL TOTAL CA: CPT | Performed by: INTERNAL MEDICINE

## 2023-07-01 PROCEDURE — 84460 ALANINE AMINO (ALT) (SGPT): CPT | Performed by: INTERNAL MEDICINE

## 2023-07-01 PROCEDURE — 84450 TRANSFERASE (AST) (SGOT): CPT | Performed by: INTERNAL MEDICINE

## 2023-07-01 PROCEDURE — 36415 COLL VENOUS BLD VENIPUNCTURE: CPT | Performed by: SPECIALIST

## 2023-07-03 ENCOUNTER — HOSPITAL ENCOUNTER (OUTPATIENT)
Age: 78
Discharge: HOME | End: 2023-07-03
Attending: SPECIALIST | Admitting: SPECIALIST

## 2023-07-03 VITALS
TEMPERATURE: 97.3 F | OXYGEN SATURATION: 100 % | SYSTOLIC BLOOD PRESSURE: 152 MMHG | HEART RATE: 62 BPM | WEIGHT: 130.07 LBS | DIASTOLIC BLOOD PRESSURE: 60 MMHG | RESPIRATION RATE: 16 BRPM | BODY MASS INDEX: 20.9 KG/M2 | HEIGHT: 66 IN

## 2023-07-03 DIAGNOSIS — E78.5 DYSLIPIDEMIA: ICD-10-CM

## 2023-07-03 DIAGNOSIS — I10 HYPERTENSION, UNSPECIFIED TYPE: ICD-10-CM

## 2023-07-03 DIAGNOSIS — I35.0 AORTIC VALVE STENOSIS, ETIOLOGY OF CARDIAC VALVE DISEASE UNSPECIFIED: ICD-10-CM

## 2023-07-03 RX ORDER — CLONIDINE HYDROCHLORIDE 0.1 MG/1
0.1 TABLET ORAL EVERY 4 HOURS PRN
Status: DISCONTINUED | OUTPATIENT
Start: 2023-07-03 | End: 2023-07-04 | Stop reason: HOSPADM

## 2023-07-03 RX ORDER — SODIUM CHLORIDE 9 MG/ML
INJECTION, SOLUTION INTRAVENOUS CONTINUOUS
Status: DISCONTINUED | OUTPATIENT
Start: 2023-07-03 | End: 2023-07-03 | Stop reason: HOSPADM

## 2023-07-03 RX ORDER — HYDRALAZINE HYDROCHLORIDE 20 MG/ML
10 INJECTION INTRAMUSCULAR; INTRAVENOUS EVERY 4 HOURS PRN
Status: DISCONTINUED | OUTPATIENT
Start: 2023-07-03 | End: 2023-07-04 | Stop reason: HOSPADM

## 2023-07-03 RX ORDER — ASPIRIN 325 MG
325 TABLET ORAL ONCE
Status: DISCONTINUED | OUTPATIENT
Start: 2023-07-03 | End: 2023-07-06 | Stop reason: HOSPADM

## 2023-07-03 RX ORDER — 0.9 % SODIUM CHLORIDE 0.9 %
2 VIAL (ML) INJECTION EVERY 12 HOURS SCHEDULED
Status: DISCONTINUED | OUTPATIENT
Start: 2023-07-03 | End: 2023-07-06 | Stop reason: HOSPADM

## 2023-07-03 ASSESSMENT — PAIN SCALES - GENERAL: PAINLEVEL_OUTOF10: 0

## 2023-07-25 ENCOUNTER — TELEPHONE (OUTPATIENT)
Dept: CARDIOLOGY | Age: 78
End: 2023-07-25

## 2023-07-31 ENCOUNTER — PREP FOR CASE (OUTPATIENT)
Dept: CARDIOLOGY | Age: 78
End: 2023-07-31

## 2023-07-31 DIAGNOSIS — E78.5 DYSLIPIDEMIA: ICD-10-CM

## 2023-07-31 DIAGNOSIS — I10 HYPERTENSION, UNSPECIFIED TYPE: Primary | ICD-10-CM

## 2023-07-31 DIAGNOSIS — I35.0 AORTIC VALVE STENOSIS, ETIOLOGY OF CARDIAC VALVE DISEASE UNSPECIFIED: ICD-10-CM

## 2023-07-31 DIAGNOSIS — R94.39 ABNORMAL RESULT OF OTHER CARDIOVASCULAR FUNCTION STUDY: ICD-10-CM

## 2023-07-31 RX ORDER — 0.9 % SODIUM CHLORIDE 0.9 %
2 VIAL (ML) INJECTION EVERY 12 HOURS SCHEDULED
Status: CANCELLED | OUTPATIENT
Start: 2023-07-31

## 2023-07-31 RX ORDER — SODIUM CHLORIDE 9 MG/ML
INJECTION, SOLUTION INTRAVENOUS CONTINUOUS
Status: CANCELLED | OUTPATIENT
Start: 2023-07-31 | End: 2023-07-31

## 2023-07-31 RX ORDER — CLONIDINE HYDROCHLORIDE 0.1 MG/1
0.1 TABLET ORAL EVERY 4 HOURS PRN
Status: CANCELLED | OUTPATIENT
Start: 2023-07-31 | End: 2023-08-01

## 2023-07-31 RX ORDER — HYDRALAZINE HYDROCHLORIDE 20 MG/ML
10 INJECTION INTRAMUSCULAR; INTRAVENOUS EVERY 4 HOURS PRN
Status: CANCELLED | OUTPATIENT
Start: 2023-07-31 | End: 2023-08-01

## 2023-07-31 RX ORDER — ASPIRIN 325 MG
325 TABLET ORAL ONCE
Status: CANCELLED | OUTPATIENT
Start: 2023-07-31 | End: 2023-07-31

## 2023-08-08 ENCOUNTER — HOSPITAL ENCOUNTER (OUTPATIENT)
Age: 78
Discharge: HOME | End: 2023-08-08
Attending: SPECIALIST | Admitting: SPECIALIST

## 2023-08-08 VITALS
HEIGHT: 66 IN | HEART RATE: 62 BPM | BODY MASS INDEX: 21.26 KG/M2 | WEIGHT: 132.28 LBS | SYSTOLIC BLOOD PRESSURE: 104 MMHG | RESPIRATION RATE: 15 BRPM | DIASTOLIC BLOOD PRESSURE: 60 MMHG | OXYGEN SATURATION: 98 %

## 2023-08-08 DIAGNOSIS — E78.5 DYSLIPIDEMIA: ICD-10-CM

## 2023-08-08 DIAGNOSIS — I10 HYPERTENSION, UNSPECIFIED TYPE: ICD-10-CM

## 2023-08-08 DIAGNOSIS — I35.0 AORTIC VALVE STENOSIS, ETIOLOGY OF CARDIAC VALVE DISEASE UNSPECIFIED: ICD-10-CM

## 2023-08-08 DIAGNOSIS — R94.39 ABNORMAL RESULT OF OTHER CARDIOVASCULAR FUNCTION STUDY: ICD-10-CM

## 2023-08-08 LAB
HCT VFR BLD CALC: 40 % (ref 39–51)
HCT VFR BLD CALC: 41 % (ref 39–51)

## 2023-08-08 PROCEDURE — C1887 CATHETER, GUIDING: HCPCS | Performed by: SPECIALIST

## 2023-08-08 PROCEDURE — 99152 MOD SED SAME PHYS/QHP 5/>YRS: CPT | Performed by: SPECIALIST

## 2023-08-08 PROCEDURE — C1760 CLOSURE DEV, VASC: HCPCS | Performed by: SPECIALIST

## 2023-08-08 PROCEDURE — 10002800 HB RX 250 W HCPCS: Performed by: SPECIALIST

## 2023-08-08 PROCEDURE — C1769 GUIDE WIRE: HCPCS | Performed by: SPECIALIST

## 2023-08-08 PROCEDURE — 10006023 HB SUPPLY 272: Performed by: SPECIALIST

## 2023-08-08 PROCEDURE — 10002807 HB RX 258: Performed by: SPECIALIST

## 2023-08-08 PROCEDURE — 13000001 HB PHASE II RECOVERY EA 30 MINUTES: Performed by: SPECIALIST

## 2023-08-08 PROCEDURE — 10006027 HB SUPPLY 278: Performed by: SPECIALIST

## 2023-08-08 PROCEDURE — 93460 R&L HRT ART/VENTRICLE ANGIO: CPT | Performed by: SPECIALIST

## 2023-08-08 PROCEDURE — 99153 MOD SED SAME PHYS/QHP EA: CPT | Performed by: SPECIALIST

## 2023-08-08 PROCEDURE — C1894 INTRO/SHEATH, NON-LASER: HCPCS | Performed by: SPECIALIST

## 2023-08-08 PROCEDURE — 10002805 HB CONTRAST AGENT: Performed by: SPECIALIST

## 2023-08-08 PROCEDURE — 10002801 HB RX 250 W/O HCPCS: Performed by: SPECIALIST

## 2023-08-08 DEVICE — PERCLOSE™ PROSTYLE™ SUTURE-MEDIATED CLOSURE AND REPAIR SYSTEM
Type: IMPLANTABLE DEVICE | Site: FEMORAL ARTERY | Status: FUNCTIONAL
Brand: PERCLOSE™ PROSTYLE™

## 2023-08-08 RX ORDER — MIDAZOLAM HYDROCHLORIDE 1 MG/ML
INJECTION, SOLUTION INTRAMUSCULAR; INTRAVENOUS PRN
Status: DISCONTINUED | OUTPATIENT
Start: 2023-08-08 | End: 2023-08-08 | Stop reason: HOSPADM

## 2023-08-08 RX ORDER — 0.9 % SODIUM CHLORIDE 0.9 %
2 VIAL (ML) INJECTION EVERY 12 HOURS SCHEDULED
Status: DISCONTINUED | OUTPATIENT
Start: 2023-08-08 | End: 2023-08-08 | Stop reason: HOSPADM

## 2023-08-08 RX ORDER — HYDRALAZINE HYDROCHLORIDE 20 MG/ML
10 INJECTION INTRAMUSCULAR; INTRAVENOUS EVERY 4 HOURS PRN
Status: DISCONTINUED | OUTPATIENT
Start: 2023-08-08 | End: 2023-08-08 | Stop reason: HOSPADM

## 2023-08-08 RX ORDER — LIDOCAINE HYDROCHLORIDE 20 MG/ML
INJECTION, SOLUTION EPIDURAL; INFILTRATION; INTRACAUDAL; PERINEURAL PRN
Status: DISCONTINUED | OUTPATIENT
Start: 2023-08-08 | End: 2023-08-08 | Stop reason: HOSPADM

## 2023-08-08 RX ORDER — ASPIRIN 81 MG/1
81 TABLET ORAL DAILY
Qty: 90 TABLET | Refills: 3 | Status: SHIPPED | OUTPATIENT
Start: 2023-08-08

## 2023-08-08 RX ORDER — SODIUM CHLORIDE 9 MG/ML
INJECTION, SOLUTION INTRAVENOUS CONTINUOUS
Status: DISCONTINUED | OUTPATIENT
Start: 2023-08-08 | End: 2023-08-08 | Stop reason: HOSPADM

## 2023-08-08 RX ORDER — CLONIDINE HYDROCHLORIDE 0.1 MG/1
0.1 TABLET ORAL EVERY 4 HOURS PRN
Status: DISCONTINUED | OUTPATIENT
Start: 2023-08-08 | End: 2023-08-08 | Stop reason: HOSPADM

## 2023-08-08 RX ORDER — ASPIRIN 325 MG
325 TABLET ORAL ONCE
Status: DISCONTINUED | OUTPATIENT
Start: 2023-08-08 | End: 2023-08-08 | Stop reason: HOSPADM

## 2023-08-08 RX ADMIN — SODIUM CHLORIDE: 9 INJECTION, SOLUTION INTRAVENOUS at 12:30

## 2023-08-08 ASSESSMENT — PAIN SCALES - GENERAL
PAINLEVEL_OUTOF10: 0

## 2023-08-09 ENCOUNTER — TELEPHONE (OUTPATIENT)
Dept: CARDIOLOGY | Age: 78
End: 2023-08-09

## 2023-08-30 ENCOUNTER — TELEPHONE (OUTPATIENT)
Dept: CARDIOLOGY | Age: 78
End: 2023-08-30

## 2023-09-11 ENCOUNTER — TELEPHONE (OUTPATIENT)
Dept: CARDIOLOGY | Age: 78
End: 2023-09-11

## 2024-01-02 ENCOUNTER — HOSPITAL ENCOUNTER (OUTPATIENT)
Dept: GENERAL RADIOLOGY | Age: 79
Discharge: HOME | End: 2024-01-02
Attending: INTERNAL MEDICINE
Payer: MEDICARE

## 2024-01-02 DIAGNOSIS — R05.9 COUGH, UNSPECIFIED TYPE: ICD-10-CM

## 2024-01-02 PROCEDURE — 71046 X-RAY EXAM CHEST 2 VIEWS: CPT | Performed by: INTERNAL MEDICINE

## 2024-01-22 ENCOUNTER — HOSPITAL ENCOUNTER (OUTPATIENT)
Dept: GENERAL RADIOLOGY | Age: 79
Discharge: HOME | End: 2024-01-22
Attending: INTERNAL MEDICINE
Payer: MEDICARE

## 2024-01-22 ENCOUNTER — APPOINTMENT (OUTPATIENT)
Dept: CARDIOLOGY | Age: 79
End: 2024-01-22

## 2024-01-22 DIAGNOSIS — R05.1 ACUTE COUGH: ICD-10-CM

## 2024-01-22 PROCEDURE — 71046 X-RAY EXAM CHEST 2 VIEWS: CPT | Performed by: INTERNAL MEDICINE

## 2024-01-25 ENCOUNTER — EXTERNAL LAB (OUTPATIENT)
Dept: OTHER | Age: 79
End: 2024-01-25

## 2024-01-25 ENCOUNTER — OFFICE VISIT (OUTPATIENT)
Dept: CARDIOLOGY | Age: 79
End: 2024-01-25

## 2024-01-25 ENCOUNTER — LAB ENCOUNTER (OUTPATIENT)
Dept: LAB | Age: 79
End: 2024-01-25
Attending: INTERNAL MEDICINE
Payer: MEDICARE

## 2024-01-25 VITALS
WEIGHT: 129.85 LBS | HEIGHT: 66 IN | OXYGEN SATURATION: 96 % | BODY MASS INDEX: 20.87 KG/M2 | HEART RATE: 83 BPM | SYSTOLIC BLOOD PRESSURE: 107 MMHG | DIASTOLIC BLOOD PRESSURE: 69 MMHG

## 2024-01-25 DIAGNOSIS — I25.10 CORONARY ARTERY DISEASE INVOLVING NATIVE CORONARY ARTERY OF NATIVE HEART WITHOUT ANGINA PECTORIS: ICD-10-CM

## 2024-01-25 DIAGNOSIS — E78.5 DYSLIPIDEMIA: ICD-10-CM

## 2024-01-25 DIAGNOSIS — I42.9 CARDIOMYOPATHY, UNSPECIFIED TYPE (CMD): ICD-10-CM

## 2024-01-25 DIAGNOSIS — Z00.00 ROUTINE GENERAL MEDICAL EXAMINATION AT A HEALTH CARE FACILITY: Primary | ICD-10-CM

## 2024-01-25 DIAGNOSIS — I35.0 SEVERE AORTIC STENOSIS: Primary | ICD-10-CM

## 2024-01-25 DIAGNOSIS — R97.20 ELEVATED PSA: ICD-10-CM

## 2024-01-25 LAB
ALBUMIN SERPL-MCNC: 3.7 G/DL (ref 3.2–4.8)
ALBUMIN SERPL-MCNC: 3.7 G/DL (ref 3.2–4.8)
ALBUMIN/GLOB SERPL: 1 {RATIO} (ref 1–2)
ALBUMIN/GLOB SERPL: 1 {RATIO} (ref 1–2)
ALP LIVER SERPL-CCNC: 55 U/L
ALP SERPL-CCNC: 55 U/L (ref 45–117)
ALT SERPL-CCNC: 11 U/L
ALT SERPL-CCNC: 11 U/L (ref 10–49)
ANION GAP SERPL CALC-SCNC: 6 MMOL/L (ref 0–18)
ANION GAP SERPL CALC-SCNC: 6 MMOL/L (ref 0–18)
AST SERPL-CCNC: 16 U/L
AST SERPL-CCNC: 16 U/L (ref ?–34)
BILIRUB SERPL-MCNC: 0.6 MG/DL (ref 0.2–1.1)
BILIRUB SERPL-MCNC: 0.6 MG/DL (ref 0.2–1.1)
BUN BLD-MCNC: 17 MG/DL (ref 9–23)
BUN SERPL-MCNC: 17 MG/DL (ref 9–23)
BUN/CREAT SERPL: 16 (ref 10–20)
BUN/CREAT SERPL: 16 (ref 10–20)
CALCIUM BLD-MCNC: 8.8 MG/DL (ref 8.7–10.4)
CALCIUM SERPL-MCNC: 8.8 MG/DL (ref 8.7–10.4)
CALCULATED OSMO: 289 MOSM/KG (ref 275–295)
CHLORIDE SERPL-SCNC: 108 MMOL/L (ref 98–112)
CHLORIDE SERPL-SCNC: 108 MMOL/L (ref 98–112)
CHOLEST SERPL-MCNC: 138 MG/DL
CHOLEST SERPL-MCNC: 138 MG/DL (ref ?–200)
CO2 SERPL-SCNC: 25 MMOL/L (ref 21–32)
CO2 SERPL-SCNC: 25 MMOL/L (ref 21–32)
COMPLEXED PSA SERPL-MCNC: 2.81 NG/ML (ref ?–4)
CREAT BLD-MCNC: 1.06 MG/DL
CREAT SERPL-MCNC: 1.06 MG/DL (ref 0.7–1.3)
EGFRCR SERPLBLD CKD-EPI 2021: 72 ML/MIN/1.73M2 (ref 60–?)
FASTING PATIENT LIPID ANSWER: YES
FASTING STATUS PATIENT QL REPORTED: YES
GFR SERPLBLD SCHWARTZ-ARVRAT: 72 ML/MIN/1.73M2
GLOBULIN PLAS-MCNC: 3.7 G/DL (ref 2.8–4.4)
GLOBULIN SER-MCNC: 3.7 G/DL (ref 2.8–4.4)
GLUCOSE BLD-MCNC: 84 MG/DL (ref 70–99)
GLUCOSE SERPL-MCNC: 84 MG/DL (ref 70–99)
HDLC SERPL-MCNC: 44 MG/DL (ref 40–59)
HDLC SERPL-MCNC: 44 MG/DL (ref 40–59)
LDLC SERPL CALC-MCNC: 77 MG/DL
LDLC SERPL CALC-MCNC: 77 MG/DL (ref ?–100)
LENGTH OF FAST TIME PATIENT: YES H
LENGTH OF FAST TIME PATIENT: YES H
NONHDLC SERPL-MCNC: 94 MG/DL
NONHDLC SERPL-MCNC: 94 MG/DL (ref ?–130)
OSMOLALITY SERPL CALC.SUM OF ELEC: 289 MOSM/KG (ref 275–295)
POTASSIUM SERPL-SCNC: 4.4 MMOL/L (ref 3.5–5.1)
POTASSIUM SERPL-SCNC: 4.4 MMOL/L (ref 3.5–5.1)
PROT SERPL-MCNC: 7.4 G/DL (ref 5.7–8.2)
PROT SERPL-MCNC: 7.4 G/DL (ref 5.7–8.2)
PSA SERPL-MCNC: 2.81 NG/ML
SODIUM SERPL-SCNC: 139 MMOL/L (ref 136–145)
SODIUM SERPL-SCNC: 139 MMOL/L (ref 136–145)
TRIGL SERPL-MCNC: 91 MG/DL (ref 30–149)
TRIGL SERPL-MCNC: 91 MG/DL (ref 30–149)
VLDLC SERPL CALC-MCNC: 14 MG/DL (ref 0–30)
VLDLC SERPL CALC-MCNC: 14 MG/DL (ref 0–30)

## 2024-01-25 PROCEDURE — 80061 LIPID PANEL: CPT

## 2024-01-25 PROCEDURE — 85025 COMPLETE CBC W/AUTO DIFF WBC: CPT | Performed by: INTERNAL MEDICINE

## 2024-01-25 PROCEDURE — 36415 COLL VENOUS BLD VENIPUNCTURE: CPT

## 2024-01-25 PROCEDURE — 80053 COMPREHEN METABOLIC PANEL: CPT

## 2024-01-25 RX ORDER — ATORVASTATIN CALCIUM 10 MG/1
10 TABLET, FILM COATED ORAL DAILY
Qty: 90 TABLET | Refills: 3 | Status: SHIPPED | OUTPATIENT
Start: 2024-01-25

## 2024-01-25 SDOH — HEALTH STABILITY: PHYSICAL HEALTH: ON AVERAGE, HOW MANY MINUTES DO YOU ENGAGE IN EXERCISE AT THIS LEVEL?: 0 MIN

## 2024-01-25 SDOH — HEALTH STABILITY: PHYSICAL HEALTH: ON AVERAGE, HOW MANY DAYS PER WEEK DO YOU ENGAGE IN MODERATE TO STRENUOUS EXERCISE (LIKE A BRISK WALK)?: 0 DAYS

## 2024-01-25 ASSESSMENT — PATIENT HEALTH QUESTIONNAIRE - PHQ9
SUM OF ALL RESPONSES TO PHQ9 QUESTIONS 1 AND 2: 0
1. LITTLE INTEREST OR PLEASURE IN DOING THINGS: NOT AT ALL
SUM OF ALL RESPONSES TO PHQ9 QUESTIONS 1 AND 2: 0
CLINICAL INTERPRETATION OF PHQ2 SCORE: NO FURTHER SCREENING NEEDED
2. FEELING DOWN, DEPRESSED OR HOPELESS: NOT AT ALL

## 2024-01-29 ENCOUNTER — TELEPHONE (OUTPATIENT)
Dept: CARDIOLOGY | Age: 79
End: 2024-01-29

## 2024-02-10 ENCOUNTER — HOSPITAL ENCOUNTER (OUTPATIENT)
Dept: CT IMAGING | Age: 79
Discharge: HOME | End: 2024-02-10
Attending: INTERNAL MEDICINE
Payer: MEDICARE

## 2024-02-10 DIAGNOSIS — J18.9 PNEUMONIA OF RIGHT UPPER LOBE DUE TO INFECTIOUS ORGANISM: ICD-10-CM

## 2024-02-10 PROCEDURE — 71250 CT THORAX DX C-: CPT | Performed by: INTERNAL MEDICINE

## 2024-02-13 ENCOUNTER — TELEPHONE (OUTPATIENT)
Dept: CARDIOLOGY | Age: 79
End: 2024-02-13

## 2024-02-21 DIAGNOSIS — E03.9 HYPOTHYROIDISM, UNSPECIFIED TYPE: Primary | ICD-10-CM

## 2024-02-24 ENCOUNTER — HOSPITAL ENCOUNTER (OUTPATIENT)
Dept: GENERAL RADIOLOGY | Age: 79
Discharge: HOME | End: 2024-02-24
Attending: INTERNAL MEDICINE
Payer: MEDICARE

## 2024-02-24 DIAGNOSIS — J18.9 PNEUMONIA OF BOTH UPPER LOBES DUE TO INFECTIOUS ORGANISM: ICD-10-CM

## 2024-02-24 PROCEDURE — 71046 X-RAY EXAM CHEST 2 VIEWS: CPT | Performed by: INTERNAL MEDICINE

## 2024-02-28 ENCOUNTER — TELEPHONE (OUTPATIENT)
Dept: PULMONOLOGY | Facility: CLINIC | Age: 79
End: 2024-02-28

## 2024-02-28 NOTE — TELEPHONE ENCOUNTER
Pt calling to schedule Consult with MD.  Pt states that his PCP Dr Geetha Leonard spoke to MD on Monday and he approved a sooner appointment.  His PCP informed him that office would be calling to schedule but to call today if he doesn't hear from office to schedule.  Pt declined to schedule first available.  Pt states that he has RSV.  Please call

## 2024-03-07 ENCOUNTER — OFFICE VISIT (OUTPATIENT)
Dept: PULMONOLOGY | Facility: CLINIC | Age: 79
End: 2024-03-07
Payer: MEDICARE

## 2024-03-07 ENCOUNTER — TELEPHONE (OUTPATIENT)
Dept: PULMONOLOGY | Facility: CLINIC | Age: 79
End: 2024-03-07

## 2024-03-07 VITALS
WEIGHT: 130 LBS | SYSTOLIC BLOOD PRESSURE: 155 MMHG | HEIGHT: 65 IN | OXYGEN SATURATION: 96 % | HEART RATE: 102 BPM | DIASTOLIC BLOOD PRESSURE: 99 MMHG | RESPIRATION RATE: 14 BRPM | BODY MASS INDEX: 21.66 KG/M2

## 2024-03-07 DIAGNOSIS — J90 PLEURAL EFFUSION: Primary | ICD-10-CM

## 2024-03-07 PROCEDURE — 99204 OFFICE O/P NEW MOD 45 MIN: CPT | Performed by: INTERNAL MEDICINE

## 2024-03-07 RX ORDER — FUROSEMIDE 20 MG/1
20 TABLET ORAL 2 TIMES DAILY
Qty: 14 TABLET | Refills: 0 | Status: SHIPPED | OUTPATIENT
Start: 2024-03-07

## 2024-03-07 NOTE — TELEPHONE ENCOUNTER
Per Dr. Bowen: Schedule right sided thoracentesis on March 13 between 1-3PM.   Contact patient with date, time, and instructions.     Attempted to contact Lucy in Radiology, left message to call back.

## 2024-03-07 NOTE — TELEPHONE ENCOUNTER
Pt states that he was supposed to have new RX for water pill but nothing was received at [facility].  Please call.

## 2024-03-07 NOTE — TELEPHONE ENCOUNTER
Dr. Bowen - Per Radiology Scheduling, they do not have any openings on Wed 3/13/24 between 1-3.  They have openings between 8am-12:45pm.

## 2024-03-07 NOTE — H&P
Referring Physician  NICCI CACERES MD    Chief Complaint  Dyspnea    History of Present Illness  Patient is a pleasant 79-year-old who presents for evaluation of dyspnea.  Treated for likely pneumonia January 2024.  At that time admits to some increased productive cough.  Some ongoing mild dyspnea with exertion noted over the last several weeks.  Denies fevers or chills.  Cough resolved at this time.  Denies history of known lung disease or significant tobacco abuse.  Unaware of any significant history of CHF.  Not on diuretic therapy.  Recent CT chest and subsequent chest x-ray with evidence of bilateral pleural effusion seen.  Denies lower extremity edema.    Review of Systems  Constitutional: denies weight loss, fevers, chills, weakness, fatigue, recent illness  HEENT: denies epistaxis, sore throat, postnasal drip  Cardio: denies chest pain, chest pressure, palpitations  Respiratory: Dyspnea with exertion, denies cough, wheezing, hemoptysis   GI: denies nausea, vomiting, abdominal pain  : denies dysuria, hematuria  Musculoskeletal: denies arthralgia, myalgia  Integumentary: denies rash, itching  Neurological: denies syncope, weakness, dizziness,   Psychiatric: denies depression, anxiety  Hematologic: denies bruising    Past Medical History  Past Medical History:   Diagnosis Date    Essential hypertension     High blood pressure     Murmur         Surgical History  Past Surgical History:   Procedure Laterality Date    COLONOSCOPY      COLONOSCOPY N/A 8/7/2018    Procedure: COLONOSCOPY;  Surgeon: Andrew Block MD;  Location: [facility] ENDOSCOPY       Family History  History reviewed. No pertinent family history.     Social History  Tobacco: Denies  Alcohol: Denies  Illicit Drugs: Denies    Medications  Current Outpatient Medications on File Prior to Visit   Medication Sig Dispense Refill    ergocalciferol 1.25 MG (23932 UT) Oral Cap       atorvastatin 10 MG Oral Tab       latanoprost 0.005 %  Ophthalmic Solution       lisinopril 2.5 MG Oral Tab  (Patient not taking: Reported on 10/5/2021)      Meclizine HCl 25 MG Oral Tab Take 1 tablet (25 mg total) by mouth every 6 (six) hours as needed for Dizziness. (Patient not taking: Reported on 10/5/2021) 20 tablet 0    AmLODIPine Besylate 5 MG Oral Tab   2    lisinopril 20 MG Oral Tab  (Patient not taking: Reported on 10/5/2021)  0    Metoprolol Succinate ER 25 MG Oral Tablet 24 Hr Take 25 mg by mouth daily. (Patient not taking: Reported on 10/5/2021)      Meclizine HCl 25 MG Oral Tab Take 1 tablet (25 mg total) by mouth 3 (three) times daily as needed for Dizziness. (Patient not taking: Reported on 10/5/2021) 30 tablet 0    Lisinopril-Hydrochlorothiazide 10-12.5 MG Oral Tab Take 1 tablet by mouth daily. (Patient not taking: Reported on 10/5/2021)       No current facility-administered medications on file prior to visit.       Allergies  No Known Allergies    Physical Exam  Constitutional: no acute distress  HEENT: PERRL  Neck: supple, no JVD  Cardio: RRR, S1 S2  Respiratory: clear to auscultation bilaterally, no wheezing, rales, rhonchi, crackles  GI: abdomen soft, non tender, active bowel sounds, no organomegaly  Extremities: no clubbing, cyanosis, edema  Neurologic: no gross motor deficits  Skin: warm, dry  Lymphatic: no supraclavicular lymphadenopathy     Imaging  Chest x-ray on 2/24/2024 with no significant right upper lobe opacity seen.  Bilateral pleural effusion seen.    Pulmonary Function Testing  None available to review    Assessment  1.  Pleural effusions  2.  Dyspnea with exertion    Plan  -Patient presents today for pulmonary evaluation.  Some recent ongoing dyspnea with exertion.  Treated for possible pneumonia with antibiotic therapy January 2024.  CT chest performed most recently on 3/10/2024 with 6 by 5 cm ill-defined opacity right upper lobe concerning for resolving infectious process.  Reviewed most recent chest x-ray from 2/24/2024 with  bilateral pleural effusion seen.  Advised patient to have repeat echo performed which she plans to do so this month.  Will monitor response to Lasix 20 mg daily in the interim.  Plan for ultrasound inspection next week for possible thoracentesis.    Eleazar Bowen DO  Pulmonary Critical Care Medicine  [facility]  3/7/2024  1:10 PM

## 2024-03-08 NOTE — TELEPHONE ENCOUNTER
Spoke with patient, informed him of procedure/date/time/location and arrival time, patient verbalized understanding.

## 2024-03-08 NOTE — TELEPHONE ENCOUNTER
Spoke with Lucy in Radiology, Right-sided Thoracentesis scheduled on 3/13/24 at 12pm at [facility], patient arrival at 11:30am. Left message for patient to cll back.

## 2024-03-13 ENCOUNTER — HOSPITAL ENCOUNTER (OUTPATIENT)
Dept: ULTRASOUND IMAGING | Facility: HOSPITAL | Age: 79
Discharge: HOME | End: 2024-03-13
Attending: INTERNAL MEDICINE
Payer: MEDICARE

## 2024-03-13 VITALS — HEIGHT: 65 IN | BODY MASS INDEX: 21.66 KG/M2 | WEIGHT: 130 LBS

## 2024-03-13 DIAGNOSIS — J90 PLEURAL EFFUSION: ICD-10-CM

## 2024-03-13 PROCEDURE — 76604 US EXAM CHEST: CPT | Performed by: INTERNAL MEDICINE

## 2024-03-13 NOTE — IMAGING NOTE
PT TO ULTRASOUND ROOM  SCANS COMPLETED BY ARAMIS MOSLEY RT     HX TAKEN PROCEDURE EXPLAINED QUESTIONS ANSWERED.    /67 HR 89 PO2 SAT 98%     PT CONSENTED AT 1207       DR. RASMUSSEN  HERE, SCANNING COMPLETED.  PLATELETS = N/A     PT= N/A     INR= N/A    PROCEDURE CANCELLED PER PATIENT REQUEST- DISCHARGE HOME

## 2024-03-19 ENCOUNTER — HOSPITAL ENCOUNTER (OUTPATIENT)
Dept: GENERAL RADIOLOGY | Facility: HOSPITAL | Age: 79
Discharge: HOME | End: 2024-03-19
Attending: INTERNAL MEDICINE
Payer: MEDICARE

## 2024-03-19 ENCOUNTER — LAB ENCOUNTER (OUTPATIENT)
Dept: LAB | Facility: HOSPITAL | Age: 79
End: 2024-03-19
Attending: INTERNAL MEDICINE
Payer: MEDICARE

## 2024-03-19 DIAGNOSIS — J90 PLEURAL EFFUSION: ICD-10-CM

## 2024-03-19 DIAGNOSIS — J90 PLEURAL EFFUSION: Primary | ICD-10-CM

## 2024-03-19 DIAGNOSIS — I10 ESSENTIAL HYPERTENSION, BENIGN: ICD-10-CM

## 2024-03-19 LAB
ANION GAP SERPL CALC-SCNC: 6 MMOL/L (ref 0–18)
BUN BLD-MCNC: 19 MG/DL (ref 9–23)
BUN/CREAT SERPL: 15.3 (ref 10–20)
CALCIUM BLD-MCNC: 9.7 MG/DL (ref 8.7–10.4)
CHLORIDE SERPL-SCNC: 109 MMOL/L (ref 98–112)
CO2 SERPL-SCNC: 26 MMOL/L (ref 21–32)
CREAT BLD-MCNC: 1.24 MG/DL
EGFRCR SERPLBLD CKD-EPI 2021: 60 ML/MIN/1.73M2 (ref 60–?)
FASTING STATUS PATIENT QL REPORTED: NO
GLUCOSE BLD-MCNC: 80 MG/DL (ref 70–99)
OSMOLALITY SERPL CALC.SUM OF ELEC: 293 MOSM/KG (ref 275–295)
POTASSIUM SERPL-SCNC: 4.3 MMOL/L (ref 3.5–5.1)
SODIUM SERPL-SCNC: 141 MMOL/L (ref 136–145)
TSI SER-ACNC: 2.91 MIU/ML (ref 0.55–4.78)

## 2024-03-19 PROCEDURE — 36415 COLL VENOUS BLD VENIPUNCTURE: CPT | Performed by: INTERNAL MEDICINE

## 2024-03-19 PROCEDURE — 71046 X-RAY EXAM CHEST 2 VIEWS: CPT | Performed by: INTERNAL MEDICINE

## 2024-03-19 PROCEDURE — 84443 ASSAY THYROID STIM HORMONE: CPT | Performed by: INTERNAL MEDICINE

## 2024-03-19 PROCEDURE — 80048 BASIC METABOLIC PNL TOTAL CA: CPT | Performed by: INTERNAL MEDICINE

## 2024-03-22 ENCOUNTER — TELEPHONE (OUTPATIENT)
Dept: PULMONOLOGY | Facility: CLINIC | Age: 79
End: 2024-03-22

## 2024-03-22 DIAGNOSIS — J90 PLEURAL EFFUSION: Primary | ICD-10-CM

## 2024-03-22 NOTE — TELEPHONE ENCOUNTER
Per Dr. Bowen, right-side thoracentesis to be scheduled Tuesday 3/26/24 or Wednesday 3/27/24 between 1:30-3pm.  Spoke with Lucy in Radiology, patient scheduled for Thoracentesis Tuesday, 3/26/24 at 2pm, arrival at 1:30pm.    Dr. Bowen - Please review/sign pended order.

## 2024-03-26 ENCOUNTER — HOSPITAL ENCOUNTER (OUTPATIENT)
Dept: ULTRASOUND IMAGING | Facility: HOSPITAL | Age: 79
Discharge: HOME | End: 2024-03-26
Attending: INTERNAL MEDICINE
Payer: MEDICARE

## 2024-03-26 ENCOUNTER — HOSPITAL ENCOUNTER (OUTPATIENT)
Dept: GENERAL RADIOLOGY | Facility: HOSPITAL | Age: 79
Discharge: HOME | End: 2024-03-26
Attending: INTERNAL MEDICINE
Payer: MEDICARE

## 2024-03-26 DIAGNOSIS — J90 PLEURAL EFFUSION: ICD-10-CM

## 2024-03-26 LAB
BASOPHILS NFR PLR: 0 %
EOSINOPHIL NFR PLR: 0 %
GLUCOSE PLR-MCNC: 116 MG/DL
LDH FLD L TO P-CCNC: 54 U/L
LYMPHOCYTES NFR PLR: 50 %
MONOS+MACROS NFR PLR: 41 %
NEUTROPHILS NFR PLR: 8 %
PROT PLR-MCNC: <2 G/DL
RBC # FLD: 199 /CUMM (ref ?–1)
TOTAL CELLS COUNTED FLD: 100
TOTAL CELLS COUNTED PLR: 299 /CUMM (ref ?–1)
TURBIDITY CSF QL: CLEAR
WBC # PLR: 296 /CUMM
WBC OTHER NFR PLR: 1 %

## 2024-03-26 PROCEDURE — 32555 ASPIRATE PLEURA W/ IMAGING: CPT | Performed by: INTERNAL MEDICINE

## 2024-03-26 NOTE — DISCHARGE INSTRUCTIONS
1,400 ML OF CLEAR YELLOW FLUID REMOVED TODAY FROM RIGHT LUNG SPACE   Discharge Instructions for Thoracentesis  Thoracentesis is a procedure that removes extra fluid from the pleural space. This space is between the outside surface of the lungs (pleura) and the chest wall. The extra fluid is called pleural effusion. Thoracentesis may be done to take a sample of the fluid. It can then be tested to help find the cause. Or the procedure may be done to drain the extra fluid if you are having trouble breathing.     Home care  You may have some pain after the procedure. Your provider may prescribe pain medicine, if needed. Take these exactly as directed.  If you stopped taking other medicines before the procedure, ask your provider when you can start them again.  Take it easy for 48 hours after the procedure. Don't do anything active until your provider says it’s OK.  Don't do strenuous activities, such as lifting, until your provider says it’s OK.  You will have a small bandage over the puncture site. You may remove the bandage in 24 hours, or when your provider says it's OK.  Check the puncture site for the signs of infection listed below.    Follow-up  Make a follow-up appointment with your provider as directed. During your follow-up visit, your provider will check your healing. Be sure to let your provider know how you are feeling.   When to call your healthcare provider  Call your provider right away if you have any of these:   Fever of 100.4°F (38°C) or higher, or as directed  Pain that doesn't get better after taking pain medicine  Signs of infection at the puncture site. These include increased pain, redness, warmth, swelling, or fluid leaking that is green or yellow or smells bad.  Fluid draining from the puncture site  Bleeding from the puncture site  When to call 911  Call 911 or get care at the nearest emergency department if any of these occur:   Chest pain that is unusual or suddenly gets worse  Shortness of  breath  Coughing up blood  Nay last reviewed this educational content on 10/1/2022  © 5046-3728 [employer]. All rights reserved. This information is not intended as a substitute for professional medical care. Always follow your healthcare professional's instructions.

## 2024-03-26 NOTE — IMAGING NOTE
1358  PT TO ULTRASOUND ROOM  SCANS COMPLETED BY dano us tech     1359  HX TAKEN PROCEDURE EXPLAINED QUESTIONS ANSWERED.     PT CONSENTED AT 1400        DR. RASMUSSEN    HERE, SCANNING COMPLETED.     TIME OUT TAKEN AT 1404    1409  CHLORO PREP  AS SKIN PREP STERILE DRAPE APPLIED;  LIDOCAINE 1% 10 MILLIGRAMS PER ML GIVEN FROM KIT  FOR ANESTHETIC AFFECT 5  ML. GIVEN INCISION MADE WITH SCALPEL.    Thoracentesis drained  CATHETER PLACED RIGHT  lung   FLUID ASPIRATED FOR LABS YES OR NO     CATHETER HOOKED TO bag DRAINING CLEAR YELLOW  COLOR FLUID.    BOTTLE #1 DRAINED FOR 1400 ML  AT 1429   SEE VITAL SIGN - FLOW SHEET.      PROCEDURE COMPLETE. PT RE SCANNED. DRAIN DC'D TOTAL AMOUNT DRAINED 1400  PRESSURE TO SITE X 5 MIN AREA CLEANED  LARGE BAND AID TO SITE.    1432 XRAY HERE FOR PORTABLE CHEST      1435  POST INSTRUCTIONS GIVEN VERBAL ET WRITTEN  AVS SUMMARY SHEET PROVIDED TO PT. PT DISCHARGED.    1445 XRAY RESULTED : CONCLUSION:    Decreasing right pleural effusion without radiographic evidence of postprocedural pneumothorax.     PT DISCHARGED VIA AMBULATORY WITH SON , DENIES SOB OR ANY PAIN AT THIS TIME

## 2024-03-26 NOTE — PROCEDURES
Right ultrasound-guided thoracentesis    Ultrasound used to visualized moderate sized right pleural effusion with no evidence of loculations seen and area of entry marked.  Patient prepped and draped in sterile fashion.  10 cc of 1% lidocaine used to anesthesize area advancing syringe while aspirating straw colored pleural fluid.  Scalpel used to make fine incision.  Thoracentesis catheter advanced while aspirating with eventual return of above mentioned pleural fluid.  Thoracentesis catheter advanced further while removing introducer needle and attached to tubing system and 1400 cc of pleural fluid removed.  Fluid sent for analysis.  Gauze applied applied after catheter removed to achieve hemostasis and bandage placed over site.  CXR ordered.    Eleazar Bowen DO  Pulmonary Critical Care Medicine  [facility]

## 2024-04-03 DIAGNOSIS — J90 PLEURAL EFFUSION: Primary | ICD-10-CM

## 2024-04-23 ENCOUNTER — HOSPITAL ENCOUNTER (OUTPATIENT)
Dept: GENERAL RADIOLOGY | Age: 79
Discharge: HOME | End: 2024-04-23
Attending: INTERNAL MEDICINE
Payer: MEDICARE

## 2024-04-23 ENCOUNTER — APPOINTMENT (OUTPATIENT)
Dept: CARDIOLOGY | Age: 79
End: 2024-04-23
Attending: SPECIALIST

## 2024-04-23 DIAGNOSIS — I42.9 CARDIOMYOPATHY, UNSPECIFIED TYPE  (CMD): ICD-10-CM

## 2024-04-23 DIAGNOSIS — I35.0 SEVERE AORTIC STENOSIS: ICD-10-CM

## 2024-04-23 DIAGNOSIS — J90 PLEURAL EFFUSION: ICD-10-CM

## 2024-04-23 LAB
AORTIC VALVE AREA (AVA): 1.53
AORTIC VALVE AREA: 0.82
ASCENDING AORTA (AAD): 5
AV MEAN GRADIENT (AVMG): 36
AV MEAN VELOCITY (AVMV): 2.84
AV PEAK GRADIENT (AVPG): 76
AV PEAK VELOCITY (AVPV): 4.37
AV STENOSIS SEVERITY TEXT: NORMAL
AVI LVOT PEAK GRADIENT (LVOTMG): 0.8
E WAVE DECELARATION TIME (MDT): 3.26
INTERVENTRICULAR SEPTUM IN END DIASTOLE (IVSD): 1.95
LEFT INTERNAL DIMENSION IN SYSTOLE (LVSD): 0.9
LEFT VENTRICULAR INTERNAL DIMENSION IN DIASTOLE (LVDD): 5
LEFT VENTRICULAR POSTERIOR WALL IN END DIASTOLE (LVPW): 6
LV EF: NORMAL %
LVOT 2D (LVOTD): 27.3
LVOT VTI (LVOTVTI): 1.14
MV E TISSUE VEL LAT (MELV): 0.66
MV E TISSUE VEL MED (MESV): 19.7
MV E WAVE VEL/E TISSUE VEL MED(MSR): 47
MV PEAK A VELOCITY (MVPAV): 103
MV PEAK E VELOCITY (MVPEV): 0.72
RV END SYSTOLIC LONGITUDINAL STRAIN FREE WALL (RVGS): 1.9
TRICUSPID VALVE ANNULAR PEAK VELOCITY (TVAPV): 76
TRICUSPID VALVE PEAK REGURGITATION VELOCITY (TRPV): 4.2
TV ESTIMATED RIGHT ARTERIAL PRESSURE (RAP): 6.87

## 2024-04-23 PROCEDURE — 71046 X-RAY EXAM CHEST 2 VIEWS: CPT | Performed by: INTERNAL MEDICINE

## 2024-04-23 PROCEDURE — 93306 TTE W/DOPPLER COMPLETE: CPT | Performed by: SPECIALIST

## 2024-04-26 ENCOUNTER — HOSPITAL ENCOUNTER (INPATIENT)
Age: 79
DRG: 286 | End: 2024-04-26
Attending: EMERGENCY MEDICINE | Admitting: INTERNAL MEDICINE

## 2024-04-26 ENCOUNTER — TELEPHONE (OUTPATIENT)
Dept: CARDIOLOGY | Age: 79
End: 2024-04-26

## 2024-04-26 ENCOUNTER — APPOINTMENT (OUTPATIENT)
Dept: GENERAL RADIOLOGY | Age: 79
DRG: 286 | End: 2024-04-26
Attending: EMERGENCY MEDICINE

## 2024-04-26 DIAGNOSIS — I35.0 SEVERE AORTIC STENOSIS: ICD-10-CM

## 2024-04-26 DIAGNOSIS — I42.0 DILATED CARDIOMYOPATHY  (CMD): ICD-10-CM

## 2024-04-26 DIAGNOSIS — I35.0 AORTIC VALVE STENOSIS, ETIOLOGY OF CARDIAC VALVE DISEASE UNSPECIFIED: ICD-10-CM

## 2024-04-26 DIAGNOSIS — R93.1 ABNORMAL ECHOCARDIOGRAM: Primary | ICD-10-CM

## 2024-04-26 DIAGNOSIS — I25.10 CORONARY ARTERY DISEASE INVOLVING NATIVE CORONARY ARTERY OF NATIVE HEART WITHOUT ANGINA PECTORIS: ICD-10-CM

## 2024-04-26 LAB
ALBUMIN SERPL-MCNC: 3 G/DL (ref 3.6–5.1)
ALBUMIN/GLOB SERPL: 0.6 {RATIO} (ref 1–2.4)
ALP SERPL-CCNC: 70 UNITS/L (ref 45–117)
ALT SERPL-CCNC: 22 UNITS/L
ANION GAP SERPL CALC-SCNC: 9 MMOL/L (ref 7–19)
APTT PPP: 29 SEC (ref 22–32)
AST SERPL-CCNC: 17 UNITS/L
ATRIAL RATE (BPM): 68
ATRIAL RATE (BPM): 84
BASOPHILS # BLD: 0 K/MCL (ref 0–0.3)
BASOPHILS NFR BLD: 0 %
BILIRUB SERPL-MCNC: 0.6 MG/DL (ref 0.2–1)
BUN SERPL-MCNC: 23 MG/DL (ref 6–20)
BUN/CREAT SERPL: 20 (ref 7–25)
CALCIUM SERPL-MCNC: 9 MG/DL (ref 8.4–10.2)
CHLORIDE SERPL-SCNC: 106 MMOL/L (ref 97–110)
CO2 SERPL-SCNC: 27 MMOL/L (ref 21–32)
CREAT SERPL-MCNC: 1.15 MG/DL (ref 0.67–1.17)
DEPRECATED RDW RBC: 46.6 FL (ref 39–50)
EGFRCR SERPLBLD CKD-EPI 2021: 65 ML/MIN/{1.73_M2}
EOSINOPHIL # BLD: 0.1 K/MCL (ref 0–0.5)
EOSINOPHIL NFR BLD: 1 %
ERYTHROCYTE [DISTWIDTH] IN BLOOD: 15.8 % (ref 11–15)
FASTING DURATION TIME PATIENT: ABNORMAL H
GLOBULIN SER-MCNC: 4.7 G/DL (ref 2–4)
GLUCOSE SERPL-MCNC: 123 MG/DL (ref 70–99)
HCT VFR BLD CALC: 47.3 % (ref 39–51)
HGB BLD-MCNC: 14.6 G/DL (ref 13–17)
IMM GRANULOCYTES # BLD AUTO: 0.1 K/MCL (ref 0–0.2)
IMM GRANULOCYTES # BLD: 0 %
INR PPP: 1.1
LYMPHOCYTES # BLD: 1.8 K/MCL (ref 1–4)
LYMPHOCYTES NFR BLD: 12 %
MAGNESIUM SERPL-MCNC: 2.3 MG/DL (ref 1.7–2.4)
MCH RBC QN AUTO: 25.5 PG (ref 26–34)
MCHC RBC AUTO-ENTMCNC: 30.9 G/DL (ref 32–36.5)
MCV RBC AUTO: 82.5 FL (ref 78–100)
MONOCYTES # BLD: 0.7 K/MCL (ref 0.3–0.9)
MONOCYTES NFR BLD: 4 %
NEUTROPHILS # BLD: 13.1 K/MCL (ref 1.8–7.7)
NEUTROPHILS NFR BLD: 83 %
NRBC BLD MANUAL-RTO: 0 /100 WBC
NT-PROBNP SERPL-MCNC: ABNORMAL PG/ML
P AXIS (DEGREES): 35
P AXIS (DEGREES): 43
PLATELET # BLD AUTO: 257 K/MCL (ref 140–450)
POTASSIUM SERPL-SCNC: 3.8 MMOL/L (ref 3.4–5.1)
PR-INTERVAL (MSEC): 142
PR-INTERVAL (MSEC): 142
PROT SERPL-MCNC: 7.7 G/DL (ref 6.4–8.2)
PROTHROMBIN TIME: 11.9 SEC (ref 9.7–11.8)
QRS-INTERVAL (MSEC): 168
QRS-INTERVAL (MSEC): 168
QT-INTERVAL (MSEC): 446
QT-INTERVAL (MSEC): 502
QTC: 527
QTC: 534
R AXIS (DEGREES): -12
R AXIS (DEGREES): -13
RAINBOW EXTRA TUBES HOLD SPECIMEN: NORMAL
RBC # BLD: 5.73 MIL/MCL (ref 4.5–5.9)
REPORT TEXT: NORMAL
REPORT TEXT: NORMAL
SODIUM SERPL-SCNC: 138 MMOL/L (ref 135–145)
T AXIS (DEGREES): -177
T AXIS (DEGREES): 155
VENTRICULAR RATE EKG/MIN (BPM): 68
VENTRICULAR RATE EKG/MIN (BPM): 84
WBC # BLD: 15.8 K/MCL (ref 4.2–11)

## 2024-04-26 PROCEDURE — 10006031 HB ROOM CHARGE TELEMETRY

## 2024-04-26 PROCEDURE — 93010 ELECTROCARDIOGRAM REPORT: CPT | Performed by: INTERNAL MEDICINE

## 2024-04-26 PROCEDURE — 80053 COMPREHEN METABOLIC PANEL: CPT | Performed by: EMERGENCY MEDICINE

## 2024-04-26 PROCEDURE — 83735 ASSAY OF MAGNESIUM: CPT | Performed by: EMERGENCY MEDICINE

## 2024-04-26 PROCEDURE — 93005 ELECTROCARDIOGRAM TRACING: CPT | Performed by: EMERGENCY MEDICINE

## 2024-04-26 PROCEDURE — 85730 THROMBOPLASTIN TIME PARTIAL: CPT | Performed by: EMERGENCY MEDICINE

## 2024-04-26 PROCEDURE — 99285 EMERGENCY DEPT VISIT HI MDM: CPT

## 2024-04-26 PROCEDURE — 36415 COLL VENOUS BLD VENIPUNCTURE: CPT

## 2024-04-26 PROCEDURE — 85025 COMPLETE CBC W/AUTO DIFF WBC: CPT | Performed by: EMERGENCY MEDICINE

## 2024-04-26 PROCEDURE — 85610 PROTHROMBIN TIME: CPT | Performed by: EMERGENCY MEDICINE

## 2024-04-26 PROCEDURE — 10004651 HB RX, NO CHARGE ITEM: Performed by: INTERNAL MEDICINE

## 2024-04-26 PROCEDURE — 93005 ELECTROCARDIOGRAM TRACING: CPT | Performed by: INTERNAL MEDICINE

## 2024-04-26 PROCEDURE — 99223 1ST HOSP IP/OBS HIGH 75: CPT | Performed by: INTERNAL MEDICINE

## 2024-04-26 PROCEDURE — 93010 ELECTROCARDIOGRAM REPORT: CPT | Performed by: SPECIALIST

## 2024-04-26 PROCEDURE — 71045 X-RAY EXAM CHEST 1 VIEW: CPT

## 2024-04-26 PROCEDURE — 83880 ASSAY OF NATRIURETIC PEPTIDE: CPT | Performed by: EMERGENCY MEDICINE

## 2024-04-26 PROCEDURE — 10002800 HB RX 250 W HCPCS: Performed by: EMERGENCY MEDICINE

## 2024-04-26 PROCEDURE — 99223 1ST HOSP IP/OBS HIGH 75: CPT | Performed by: SPECIALIST

## 2024-04-26 RX ORDER — FUROSEMIDE 20 MG/1
40 TABLET ORAL DAILY
COMMUNITY

## 2024-04-26 RX ORDER — ASPIRIN 81 MG/1
81 TABLET ORAL DAILY
Status: DISCONTINUED | OUTPATIENT
Start: 2024-04-26 | End: 2024-05-02 | Stop reason: HOSPADM

## 2024-04-26 RX ORDER — FUROSEMIDE 10 MG/ML
60 INJECTION INTRAMUSCULAR; INTRAVENOUS ONCE
Status: COMPLETED | OUTPATIENT
Start: 2024-04-26 | End: 2024-04-26

## 2024-04-26 RX ORDER — TAMSULOSIN HYDROCHLORIDE 0.4 MG/1
0.4 CAPSULE ORAL DAILY
Status: DISCONTINUED | OUTPATIENT
Start: 2024-04-27 | End: 2024-05-02 | Stop reason: HOSPADM

## 2024-04-26 RX ORDER — ATORVASTATIN CALCIUM 10 MG/1
10 TABLET, FILM COATED ORAL DAILY
Status: DISCONTINUED | OUTPATIENT
Start: 2024-04-27 | End: 2024-05-02 | Stop reason: HOSPADM

## 2024-04-26 RX ADMIN — ASPIRIN 81 MG: 81 TABLET, COATED ORAL at 17:51

## 2024-04-26 RX ADMIN — FUROSEMIDE 60 MG: 10 INJECTION, SOLUTION INTRAMUSCULAR; INTRAVENOUS at 15:04

## 2024-04-26 SDOH — ECONOMIC STABILITY: TRANSPORTATION INSECURITY
IN THE PAST 12 MONTHS, HAS LACK OF RELIABLE TRANSPORTATION KEPT YOU FROM MEDICAL APPOINTMENTS, MEETINGS, WORK OR FROM GETTING THINGS NEEDED FOR DAILY LIVING?: NO

## 2024-04-26 SDOH — ECONOMIC STABILITY: HOUSING INSECURITY: WHAT IS YOUR LIVING SITUATION TODAY?: HOUSE

## 2024-04-26 SDOH — HEALTH STABILITY: GENERAL
BECAUSE OF A PHYSICAL, MENTAL, OR EMOTIONAL CONDITION, DO YOU HAVE SERIOUS DIFFICULTY CONCENTRATING, REMEMBERING OR MAKING DECISIONS?: NO

## 2024-04-26 SDOH — HEALTH STABILITY: PHYSICAL HEALTH: DO YOU HAVE DIFFICULTY DRESSING OR BATHING?: NO

## 2024-04-26 SDOH — ECONOMIC STABILITY: HOUSING INSECURITY: DO YOU HAVE PROBLEMS WITH ANY OF THE FOLLOWING?: NONE OF THE ABOVE

## 2024-04-26 SDOH — ECONOMIC STABILITY: FOOD INSECURITY: WITHIN THE PAST 12 MONTHS, THE FOOD YOU BOUGHT JUST DIDN'T LAST AND YOU DIDN'T HAVE MONEY TO GET MORE.: NEVER TRUE

## 2024-04-26 SDOH — SOCIAL STABILITY: SOCIAL INSECURITY: HOW OFTEN DOES ANYONE, INCLUDING FAMILY AND FRIENDS, SCREAM OR CURSE AT YOU?: NEVER

## 2024-04-26 SDOH — SOCIAL STABILITY: SOCIAL INSECURITY: HOW OFTEN DOES ANYONE, INCLUDING FAMILY AND FRIENDS, INSULT OR TALK DOWN TO YOU?: NEVER

## 2024-04-26 SDOH — SOCIAL STABILITY: SOCIAL NETWORK: SUPPORT SYSTEMS: FAMILY MEMBERS

## 2024-04-26 SDOH — SOCIAL STABILITY: SOCIAL INSECURITY: HOW OFTEN DOES ANYONE, INCLUDING FAMILY AND FRIENDS, THREATEN YOU WITH HARM?: NEVER

## 2024-04-26 SDOH — HEALTH STABILITY: GENERAL: BECAUSE OF A PHYSICAL, MENTAL, OR EMOTIONAL CONDITION, DO YOU HAVE DIFFICULTY DOING ERRANDS ALONE?: NO

## 2024-04-26 SDOH — SOCIAL STABILITY: SOCIAL INSECURITY: HOW OFTEN DOES ANYONE, INCLUDING FAMILY AND FRIENDS, PHYSICALLY HURT YOU?: NEVER

## 2024-04-26 SDOH — ECONOMIC STABILITY: HOUSING INSECURITY: WHAT IS YOUR LIVING SITUATION TODAY?: CHILDREN

## 2024-04-26 SDOH — ECONOMIC STABILITY: INCOME INSECURITY: IN THE PAST 12 MONTHS, HAS THE ELECTRIC, GAS, OIL, OR WATER COMPANY THREATENED TO SHUT OFF SERVICE IN YOUR HOME?: NO

## 2024-04-26 SDOH — ECONOMIC STABILITY: GENERAL: WOULD YOU LIKE HELP WITH ANY OF THE FOLLOWING NEEDS?: I DON'T WANT HELP WITH ANY OF THESE

## 2024-04-26 SDOH — SOCIAL STABILITY: SOCIAL NETWORK
HOW OFTEN DO YOU SEE OR TALK TO PEOPLE THAT YOU CARE ABOUT AND FEEL CLOSE TO? (FOR EXAMPLE: TALKING TO FRIENDS ON THE PHONE, VISITING FRIENDS OR FAMILY, GOING TO CHURCH OR CLUB MEETINGS): 5 OR MORE TIMES A WEEK

## 2024-04-26 SDOH — ECONOMIC STABILITY: GENERAL

## 2024-04-26 SDOH — HEALTH STABILITY: PHYSICAL HEALTH: DO YOU HAVE SERIOUS DIFFICULTY WALKING OR CLIMBING STAIRS?: NO

## 2024-04-26 SDOH — ECONOMIC STABILITY: HOUSING INSECURITY: WHAT IS YOUR LIVING SITUATION TODAY?: I HAVE A STEADY PLACE TO LIVE

## 2024-04-26 ASSESSMENT — COLUMBIA-SUICIDE SEVERITY RATING SCALE - C-SSRS
1. WITHIN THE PAST MONTH, HAVE YOU WISHED YOU WERE DEAD OR WISHED YOU COULD GO TO SLEEP AND NOT WAKE UP?: NO
6. HAVE YOU EVER DONE ANYTHING, STARTED TO DO ANYTHING, OR PREPARED TO DO ANYTHING TO END YOUR LIFE?: NO
2. HAVE YOU ACTUALLY HAD ANY THOUGHTS OF KILLING YOURSELF?: NO
IS THE PATIENT ABLE TO COMPLETE C-SSRS: YES

## 2024-04-26 ASSESSMENT — LIFESTYLE VARIABLES
HOW OFTEN DO YOU HAVE 6 OR MORE DRINKS ON ONE OCCASION: NEVER
ALCOHOL_USE_STATUS: NO OR LOW RISK WITH VALIDATED TOOL
HOW MANY STANDARD DRINKS CONTAINING ALCOHOL DO YOU HAVE ON A TYPICAL DAY: 0,1 OR 2
HOW OFTEN DO YOU HAVE A DRINK CONTAINING ALCOHOL: NEVER
AUDIT-C TOTAL SCORE: 0

## 2024-04-26 ASSESSMENT — ACTIVITIES OF DAILY LIVING (ADL)
ADL_SCORE: 12
RECENT_DECLINE_ADL: NO
ADL_SHORT_OF_BREATH: NO
ADL_BEFORE_ADMISSION: INDEPENDENT

## 2024-04-26 ASSESSMENT — ORIENTATION MEMORY CONCENTRATION TEST (OMCT)
WHAT MONTH IS IT NOW: CORRECT
OMCT INTERPRETATION: 0-6: NO SIGNIFICANT IMPAIRMENT
REPEAT THE NAME AND ADDRESS I ASKED YOU TO REMEMBER: CORRECT
COUNT BACKWARDS FROM 20 TO 1: CORRECT
SAY THE MONTHS IN REVERSE ORDER STARTING WITH LAST MONTH: CORRECT
WHAT YEAR IS IT NOW (MUST BE EXACT): CORRECT
WHAT TIME IS IT (NO WATCH OR CLOCK): CORRECT
OMCT SCORE: 0

## 2024-04-26 ASSESSMENT — PAIN SCALES - GENERAL
PAINLEVEL_OUTOF10: 0

## 2024-04-26 ASSESSMENT — PATIENT HEALTH QUESTIONNAIRE - PHQ9
SUM OF ALL RESPONSES TO PHQ9 QUESTIONS 1 AND 2: 0
1. LITTLE INTEREST OR PLEASURE IN DOING THINGS: NOT AT ALL
SUM OF ALL RESPONSES TO PHQ9 QUESTIONS 1 AND 2: 0
2. FEELING DOWN, DEPRESSED OR HOPELESS: NOT AT ALL
CLINICAL INTERPRETATION OF PHQ2 SCORE: NO FURTHER SCREENING NEEDED
IS PATIENT ABLE TO COMPLETE PHQ2 OR PHQ9: YES

## 2024-04-27 VITALS
DIASTOLIC BLOOD PRESSURE: 63 MMHG | WEIGHT: 118.17 LBS | HEART RATE: 68 BPM | SYSTOLIC BLOOD PRESSURE: 95 MMHG | BODY MASS INDEX: 19.69 KG/M2 | OXYGEN SATURATION: 98 % | HEIGHT: 65 IN | TEMPERATURE: 97.9 F | RESPIRATION RATE: 16 BRPM

## 2024-04-27 LAB
ANION GAP SERPL CALC-SCNC: 9 MMOL/L (ref 7–19)
APPEARANCE FLD: CLEAR
BACTERIA SPEC ANAEROBE+AEROBE CULT: NORMAL
BASOPHILS NFR FLD: 1 %
BUN SERPL-MCNC: 24 MG/DL (ref 6–20)
BUN/CREAT SERPL: 22 (ref 7–25)
CALCIUM SERPL-MCNC: 8.7 MG/DL (ref 8.4–10.2)
CHLORIDE SERPL-SCNC: 106 MMOL/L (ref 97–110)
CO2 SERPL-SCNC: 29 MMOL/L (ref 21–32)
COLOR FLD: NORMAL
CREAT SERPL-MCNC: 1.09 MG/DL (ref 0.67–1.17)
DEPRECATED RDW RBC: 45 FL (ref 39–50)
EGFRCR SERPLBLD CKD-EPI 2021: 69 ML/MIN/{1.73_M2}
ERYTHROCYTE [DISTWIDTH] IN BLOOD: 15.4 % (ref 11–15)
FASTING DURATION TIME PATIENT: ABNORMAL H
GLUCOSE FLD-MCNC: 100 MG/DL (ref 80–120)
GLUCOSE SERPL-MCNC: 85 MG/DL (ref 70–99)
GRAM STN SPEC: NORMAL
HCT VFR BLD CALC: 44 % (ref 39–51)
HGB BLD-MCNC: 14 G/DL (ref 13–17)
LDH FLD L TO P-CCNC: 59 UNITS/L (ref 40–120)
LYMPHOCYTES NFR FLD: 22 %
MCH RBC QN AUTO: 25.8 PG (ref 26–34)
MCHC RBC AUTO-ENTMCNC: 31.8 G/DL (ref 32–36.5)
MCV RBC AUTO: 81 FL (ref 78–100)
MESOTHL CELL NFR FLD: 1 %
MONOCYTES NFR FLD: 73 %
NEUTS SEG NFR FLD: 3 %
NRBC BLD MANUAL-RTO: 0 /100 WBC
PLATELET # BLD AUTO: 210 K/MCL (ref 140–450)
POTASSIUM SERPL-SCNC: 3.6 MMOL/L (ref 3.4–5.1)
PROT FLD-MCNC: 2.5 G/DL (ref 1–2.5)
RBC # BLD: 5.43 MIL/MCL (ref 4.5–5.9)
SODIUM SERPL-SCNC: 140 MMOL/L (ref 135–145)
TOTAL CELLS COUNTED FLD: 100
WBC # BLD: 10.1 K/MCL (ref 4.2–11)
WBC # FLD: 228 /MCL (ref 0–1000)

## 2024-04-27 PROCEDURE — 10002800 HB RX 250 W HCPCS: Performed by: INTERNAL MEDICINE

## 2024-04-27 PROCEDURE — 88305 TISSUE EXAM BY PATHOLOGIST: CPT | Performed by: INTERNAL MEDICINE

## 2024-04-27 PROCEDURE — 10006031 HB ROOM CHARGE TELEMETRY

## 2024-04-27 PROCEDURE — 82945 GLUCOSE OTHER FLUID: CPT | Performed by: INTERNAL MEDICINE

## 2024-04-27 PROCEDURE — 85027 COMPLETE CBC AUTOMATED: CPT | Performed by: INTERNAL MEDICINE

## 2024-04-27 PROCEDURE — 99233 SBSQ HOSP IP/OBS HIGH 50: CPT | Performed by: INTERNAL MEDICINE

## 2024-04-27 PROCEDURE — 0W993ZZ DRAINAGE OF RIGHT PLEURAL CAVITY, PERCUTANEOUS APPROACH: ICD-10-PCS | Performed by: INTERNAL MEDICINE

## 2024-04-27 PROCEDURE — 10004651 HB RX, NO CHARGE ITEM: Performed by: INTERNAL MEDICINE

## 2024-04-27 PROCEDURE — 10002803 HB RX 637: Performed by: INTERNAL MEDICINE

## 2024-04-27 PROCEDURE — 36415 COLL VENOUS BLD VENIPUNCTURE: CPT | Performed by: INTERNAL MEDICINE

## 2024-04-27 PROCEDURE — 89051 BODY FLUID CELL COUNT: CPT | Performed by: INTERNAL MEDICINE

## 2024-04-27 PROCEDURE — 32555 ASPIRATE PLEURA W/ IMAGING: CPT | Performed by: INTERNAL MEDICINE

## 2024-04-27 PROCEDURE — 83615 LACTATE (LD) (LDH) ENZYME: CPT | Performed by: INTERNAL MEDICINE

## 2024-04-27 PROCEDURE — 84157 ASSAY OF PROTEIN OTHER: CPT | Performed by: INTERNAL MEDICINE

## 2024-04-27 PROCEDURE — 96372 THER/PROPH/DIAG INJ SC/IM: CPT | Performed by: INTERNAL MEDICINE

## 2024-04-27 PROCEDURE — 87205 SMEAR GRAM STAIN: CPT | Performed by: INTERNAL MEDICINE

## 2024-04-27 PROCEDURE — 80048 BASIC METABOLIC PNL TOTAL CA: CPT | Performed by: INTERNAL MEDICINE

## 2024-04-27 RX ORDER — ENOXAPARIN SODIUM 100 MG/ML
40 INJECTION SUBCUTANEOUS EVERY 24 HOURS
Status: DISCONTINUED | OUTPATIENT
Start: 2024-04-27 | End: 2024-05-02 | Stop reason: HOSPADM

## 2024-04-27 RX ADMIN — ENOXAPARIN SODIUM 40 MG: 100 INJECTION SUBCUTANEOUS at 15:36

## 2024-04-27 RX ADMIN — TAMSULOSIN HYDROCHLORIDE 0.4 MG: 0.4 CAPSULE ORAL at 10:08

## 2024-04-27 RX ADMIN — ASPIRIN 81 MG: 81 TABLET, COATED ORAL at 10:08

## 2024-04-27 RX ADMIN — ATORVASTATIN CALCIUM 10 MG: 10 TABLET, FILM COATED ORAL at 10:08

## 2024-04-27 ASSESSMENT — PAIN SCALES - GENERAL
PAINLEVEL_OUTOF10: 0

## 2024-04-27 ASSESSMENT — ENCOUNTER SYMPTOMS
NEUROLOGICAL NEGATIVE: 1
SHORTNESS OF BREATH: 1
GASTROINTESTINAL NEGATIVE: 1
ACTIVITY CHANGE: 1
FATIGUE: 1

## 2024-04-28 LAB
ATRIAL RATE (BPM): 68
P AXIS (DEGREES): 35
PR-INTERVAL (MSEC): 142
QRS-INTERVAL (MSEC): 168
QT-INTERVAL (MSEC): 502
QTC: 534
R AXIS (DEGREES): -12
REPORT TEXT: NORMAL
T AXIS (DEGREES): -177
VENTRICULAR RATE EKG/MIN (BPM): 68

## 2024-04-28 PROCEDURE — 10006031 HB ROOM CHARGE TELEMETRY

## 2024-04-28 PROCEDURE — 96372 THER/PROPH/DIAG INJ SC/IM: CPT | Performed by: INTERNAL MEDICINE

## 2024-04-28 PROCEDURE — 99233 SBSQ HOSP IP/OBS HIGH 50: CPT | Performed by: NURSE PRACTITIONER

## 2024-04-28 PROCEDURE — 10004651 HB RX, NO CHARGE ITEM: Performed by: INTERNAL MEDICINE

## 2024-04-28 PROCEDURE — 10002803 HB RX 637: Performed by: INTERNAL MEDICINE

## 2024-04-28 PROCEDURE — 99233 SBSQ HOSP IP/OBS HIGH 50: CPT | Performed by: INTERNAL MEDICINE

## 2024-04-28 PROCEDURE — 10002800 HB RX 250 W HCPCS: Performed by: INTERNAL MEDICINE

## 2024-04-28 RX ADMIN — ENOXAPARIN SODIUM 40 MG: 100 INJECTION SUBCUTANEOUS at 14:38

## 2024-04-28 RX ADMIN — TAMSULOSIN HYDROCHLORIDE 0.4 MG: 0.4 CAPSULE ORAL at 08:58

## 2024-04-28 RX ADMIN — ASPIRIN 81 MG: 81 TABLET, COATED ORAL at 08:58

## 2024-04-28 RX ADMIN — ATORVASTATIN CALCIUM 10 MG: 10 TABLET, FILM COATED ORAL at 08:58

## 2024-04-28 ASSESSMENT — PAIN SCALES - GENERAL
PAINLEVEL_OUTOF10: 0
PAINLEVEL_OUTOF10: 0

## 2024-04-29 ENCOUNTER — APPOINTMENT (OUTPATIENT)
Dept: CT IMAGING | Age: 79
DRG: 286 | End: 2024-04-29
Attending: NURSE PRACTITIONER

## 2024-04-29 ENCOUNTER — APPOINTMENT (OUTPATIENT)
Dept: ULTRASOUND IMAGING | Age: 79
DRG: 286 | End: 2024-04-29
Attending: NURSE PRACTITIONER

## 2024-04-29 PROCEDURE — 10002800 HB RX 250 W HCPCS: Performed by: INTERNAL MEDICINE

## 2024-04-29 PROCEDURE — 71275 CT ANGIOGRAPHY CHEST: CPT | Performed by: INTERNAL MEDICINE

## 2024-04-29 PROCEDURE — 71275 CT ANGIOGRAPHY CHEST: CPT

## 2024-04-29 PROCEDURE — 10002803 HB RX 637: Performed by: INTERNAL MEDICINE

## 2024-04-29 PROCEDURE — 99233 SBSQ HOSP IP/OBS HIGH 50: CPT | Performed by: INTERNAL MEDICINE

## 2024-04-29 PROCEDURE — 93880 EXTRACRANIAL BILAT STUDY: CPT

## 2024-04-29 PROCEDURE — 10004651 HB RX, NO CHARGE ITEM: Performed by: INTERNAL MEDICINE

## 2024-04-29 PROCEDURE — 10006031 HB ROOM CHARGE TELEMETRY

## 2024-04-29 PROCEDURE — 96372 THER/PROPH/DIAG INJ SC/IM: CPT | Performed by: INTERNAL MEDICINE

## 2024-04-29 PROCEDURE — 10002805 HB CONTRAST AGENT: Performed by: NURSE PRACTITIONER

## 2024-04-29 PROCEDURE — 74174 CTA ABD&PLVS W/CONTRAST: CPT

## 2024-04-29 PROCEDURE — 10002801 HB RX 250 W/O HCPCS: Performed by: INTERNAL MEDICINE

## 2024-04-29 RX ORDER — METOPROLOL TARTRATE 1 MG/ML
INJECTION, SOLUTION INTRAVENOUS PRN
Status: COMPLETED | OUTPATIENT
Start: 2024-04-29 | End: 2024-04-29

## 2024-04-29 RX ADMIN — ENOXAPARIN SODIUM 40 MG: 100 INJECTION SUBCUTANEOUS at 15:14

## 2024-04-29 RX ADMIN — METOROPROLOL TARTRATE 5 MG: 5 INJECTION, SOLUTION INTRAVENOUS at 13:49

## 2024-04-29 RX ADMIN — IOHEXOL 96 ML: 350 INJECTION, SOLUTION INTRAVENOUS at 13:58

## 2024-04-29 RX ADMIN — ATORVASTATIN CALCIUM 10 MG: 10 TABLET, FILM COATED ORAL at 09:11

## 2024-04-29 RX ADMIN — TAMSULOSIN HYDROCHLORIDE 0.4 MG: 0.4 CAPSULE ORAL at 09:11

## 2024-04-29 RX ADMIN — ASPIRIN 81 MG: 81 TABLET, COATED ORAL at 09:11

## 2024-04-29 SDOH — ECONOMIC STABILITY: GENERAL

## 2024-04-29 SDOH — ECONOMIC STABILITY: HOUSING INSECURITY: DO YOU HAVE PROBLEMS WITH ANY OF THE FOLLOWING?: NONE OF THE ABOVE

## 2024-04-29 SDOH — ECONOMIC STABILITY: HOUSING INSECURITY: WHAT IS YOUR LIVING SITUATION TODAY?: I HAVE A STEADY PLACE TO LIVE

## 2024-04-29 ASSESSMENT — COGNITIVE AND FUNCTIONAL STATUS - GENERAL
DO YOU HAVE DIFFICULTY DRESSING OR BATHING: NO
BECAUSE OF A PHYSICAL, MENTAL, OR EMOTIONAL CONDITION, DO YOU HAVE DIFFICULTY DOING ERRANDS ALONE: NO
BECAUSE OF A PHYSICAL, MENTAL, OR EMOTIONAL CONDITION, DO YOU HAVE SERIOUS DIFFICULTY CONCENTRATING, REMEMBERING OR MAKING DECISIONS: NO
DO YOU HAVE SERIOUS DIFFICULTY WALKING OR CLIMBING STAIRS: NO

## 2024-04-29 ASSESSMENT — PAIN SCALES - GENERAL: PAINLEVEL_OUTOF10: 0

## 2024-04-30 LAB
ANION GAP SERPL CALC-SCNC: 6 MMOL/L (ref 7–19)
BASOPHILS # BLD: 0 K/MCL (ref 0–0.3)
BASOPHILS NFR BLD: 0 %
BUN SERPL-MCNC: 21 MG/DL (ref 6–20)
BUN/CREAT SERPL: 24 (ref 7–25)
CALCIUM SERPL-MCNC: 8.4 MG/DL (ref 8.4–10.2)
CHLORIDE SERPL-SCNC: 107 MMOL/L (ref 97–110)
CO2 SERPL-SCNC: 26 MMOL/L (ref 21–32)
CREAT SERPL-MCNC: 0.88 MG/DL (ref 0.67–1.17)
DEPRECATED RDW RBC: 45.7 FL (ref 39–50)
EGFRCR SERPLBLD CKD-EPI 2021: 88 ML/MIN/{1.73_M2}
EOSINOPHIL # BLD: 0.2 K/MCL (ref 0–0.5)
EOSINOPHIL NFR BLD: 2 %
ERYTHROCYTE [DISTWIDTH] IN BLOOD: 15.5 % (ref 11–15)
FASTING DURATION TIME PATIENT: ABNORMAL H
GLUCOSE SERPL-MCNC: 125 MG/DL (ref 70–99)
HCT VFR BLD CALC: 40.8 % (ref 39–51)
HGB BLD-MCNC: 12.8 G/DL (ref 13–17)
IMM GRANULOCYTES # BLD AUTO: 0 K/MCL (ref 0–0.2)
IMM GRANULOCYTES # BLD: 0 %
LYMPHOCYTES # BLD: 1.7 K/MCL (ref 1–4)
LYMPHOCYTES NFR BLD: 20 %
MCH RBC QN AUTO: 25.7 PG (ref 26–34)
MCHC RBC AUTO-ENTMCNC: 31.4 G/DL (ref 32–36.5)
MCV RBC AUTO: 81.8 FL (ref 78–100)
MONOCYTES # BLD: 0.5 K/MCL (ref 0.3–0.9)
MONOCYTES NFR BLD: 6 %
NEUTROPHILS # BLD: 5.7 K/MCL (ref 1.8–7.7)
NEUTROPHILS NFR BLD: 72 %
NRBC BLD MANUAL-RTO: 0 /100 WBC
PLATELET # BLD AUTO: 178 K/MCL (ref 140–450)
POTASSIUM SERPL-SCNC: 4 MMOL/L (ref 3.4–5.1)
RBC # BLD: 4.99 MIL/MCL (ref 4.5–5.9)
SODIUM SERPL-SCNC: 135 MMOL/L (ref 135–145)
WBC # BLD: 8.1 K/MCL (ref 4.2–11)

## 2024-04-30 PROCEDURE — 96372 THER/PROPH/DIAG INJ SC/IM: CPT | Performed by: INTERNAL MEDICINE

## 2024-04-30 PROCEDURE — 99233 SBSQ HOSP IP/OBS HIGH 50: CPT | Performed by: SPECIALIST

## 2024-04-30 PROCEDURE — 99233 SBSQ HOSP IP/OBS HIGH 50: CPT | Performed by: INTERNAL MEDICINE

## 2024-04-30 PROCEDURE — 80048 BASIC METABOLIC PNL TOTAL CA: CPT | Performed by: INTERNAL MEDICINE

## 2024-04-30 PROCEDURE — 10006031 HB ROOM CHARGE TELEMETRY

## 2024-04-30 PROCEDURE — 10004651 HB RX, NO CHARGE ITEM: Performed by: INTERNAL MEDICINE

## 2024-04-30 PROCEDURE — 10002800 HB RX 250 W HCPCS: Performed by: INTERNAL MEDICINE

## 2024-04-30 PROCEDURE — 10002803 HB RX 637: Performed by: INTERNAL MEDICINE

## 2024-04-30 PROCEDURE — 85025 COMPLETE CBC W/AUTO DIFF WBC: CPT | Performed by: INTERNAL MEDICINE

## 2024-04-30 PROCEDURE — 36415 COLL VENOUS BLD VENIPUNCTURE: CPT | Performed by: INTERNAL MEDICINE

## 2024-04-30 RX ADMIN — ASPIRIN 81 MG: 81 TABLET, COATED ORAL at 09:57

## 2024-04-30 RX ADMIN — ATORVASTATIN CALCIUM 10 MG: 10 TABLET, FILM COATED ORAL at 09:56

## 2024-04-30 RX ADMIN — TAMSULOSIN HYDROCHLORIDE 0.4 MG: 0.4 CAPSULE ORAL at 09:57

## 2024-04-30 RX ADMIN — ENOXAPARIN SODIUM 40 MG: 100 INJECTION SUBCUTANEOUS at 15:58

## 2024-04-30 ASSESSMENT — PAIN SCALES - GENERAL
PAINLEVEL_OUTOF10: 0
PAINLEVEL_OUTOF10: 0

## 2024-05-01 LAB
BACTERIA SPEC ANAEROBE+AEROBE CULT: NORMAL
GRAM STN SPEC: NORMAL
HCT VFR BLD CALC: 41 % (ref 39–51)
HCT VFR BLD CALC: 41 % (ref 39–51)

## 2024-05-01 PROCEDURE — 99152 MOD SED SAME PHYS/QHP 5/>YRS: CPT | Performed by: SPECIALIST

## 2024-05-01 PROCEDURE — 10002805 HB CONTRAST AGENT: Performed by: SPECIALIST

## 2024-05-01 PROCEDURE — 99233 SBSQ HOSP IP/OBS HIGH 50: CPT | Performed by: INTERNAL MEDICINE

## 2024-05-01 PROCEDURE — 99153 MOD SED SAME PHYS/QHP EA: CPT | Performed by: SPECIALIST

## 2024-05-01 PROCEDURE — 10002803 HB RX 637: Performed by: INTERNAL MEDICINE

## 2024-05-01 PROCEDURE — C1760 CLOSURE DEV, VASC: HCPCS | Performed by: SPECIALIST

## 2024-05-01 PROCEDURE — C1769 GUIDE WIRE: HCPCS | Performed by: SPECIALIST

## 2024-05-01 PROCEDURE — C1894 INTRO/SHEATH, NON-LASER: HCPCS | Performed by: SPECIALIST

## 2024-05-01 PROCEDURE — 93456 R HRT CORONARY ARTERY ANGIO: CPT | Performed by: SPECIALIST

## 2024-05-01 PROCEDURE — C1725 CATH, TRANSLUMIN NON-LASER: HCPCS | Performed by: SPECIALIST

## 2024-05-01 PROCEDURE — 10006031 HB ROOM CHARGE TELEMETRY

## 2024-05-01 PROCEDURE — 10002800 HB RX 250 W HCPCS: Performed by: SPECIALIST

## 2024-05-01 PROCEDURE — 10006023 HB SUPPLY 272: Performed by: SPECIALIST

## 2024-05-01 PROCEDURE — B2111ZZ FLUOROSCOPY OF MULTIPLE CORONARY ARTERIES USING LOW OSMOLAR CONTRAST: ICD-10-PCS | Performed by: SPECIALIST

## 2024-05-01 PROCEDURE — 10002801 HB RX 250 W/O HCPCS: Performed by: SPECIALIST

## 2024-05-01 PROCEDURE — 10004651 HB RX, NO CHARGE ITEM: Performed by: INTERNAL MEDICINE

## 2024-05-01 PROCEDURE — 4A023N8 MEASUREMENT OF CARDIAC SAMPLING AND PRESSURE, BILATERAL, PERCUTANEOUS APPROACH: ICD-10-PCS | Performed by: SPECIALIST

## 2024-05-01 PROCEDURE — 10006027 HB SUPPLY 278: Performed by: SPECIALIST

## 2024-05-01 DEVICE — MYNXGRIP 6F/7F
Type: IMPLANTABLE DEVICE | Site: GROIN | Status: FUNCTIONAL
Brand: MYNXGRIP

## 2024-05-01 RX ORDER — LIDOCAINE HYDROCHLORIDE 20 MG/ML
INJECTION, SOLUTION EPIDURAL; INFILTRATION; INTRACAUDAL; PERINEURAL PRN
Status: DISCONTINUED | OUTPATIENT
Start: 2024-05-01 | End: 2024-05-01 | Stop reason: HOSPADM

## 2024-05-01 RX ORDER — MIDAZOLAM HYDROCHLORIDE 1 MG/ML
INJECTION, SOLUTION INTRAMUSCULAR; INTRAVENOUS PRN
Status: DISCONTINUED | OUTPATIENT
Start: 2024-05-01 | End: 2024-05-01 | Stop reason: HOSPADM

## 2024-05-01 RX ADMIN — ASPIRIN 81 MG: 81 TABLET, COATED ORAL at 07:50

## 2024-05-01 RX ADMIN — TAMSULOSIN HYDROCHLORIDE 0.4 MG: 0.4 CAPSULE ORAL at 07:50

## 2024-05-01 RX ADMIN — ATORVASTATIN CALCIUM 10 MG: 10 TABLET, FILM COATED ORAL at 07:50

## 2024-05-01 ASSESSMENT — PAIN SCALES - GENERAL
PAINLEVEL_OUTOF10: 0
PAINLEVEL_OUTOF10: 0

## 2024-05-02 VITALS
BODY MASS INDEX: 19.94 KG/M2 | DIASTOLIC BLOOD PRESSURE: 74 MMHG | WEIGHT: 119.71 LBS | OXYGEN SATURATION: 99 % | HEART RATE: 74 BPM | RESPIRATION RATE: 16 BRPM | TEMPERATURE: 97.5 F | HEIGHT: 65 IN | SYSTOLIC BLOOD PRESSURE: 114 MMHG

## 2024-05-02 LAB
ANION GAP SERPL CALC-SCNC: 7 MMOL/L (ref 7–19)
ASR DISCLAIMER: NORMAL
BASOPHILS # BLD: 0 K/MCL (ref 0–0.3)
BASOPHILS NFR BLD: 0 %
BUN SERPL-MCNC: 18 MG/DL (ref 6–20)
BUN/CREAT SERPL: 19 (ref 7–25)
CALCIUM SERPL-MCNC: 8.6 MG/DL (ref 8.4–10.2)
CASE RPRT: NORMAL
CHLORIDE SERPL-SCNC: 110 MMOL/L (ref 97–110)
CLINICAL INFO: NORMAL
CO2 SERPL-SCNC: 25 MMOL/L (ref 21–32)
CREAT SERPL-MCNC: 0.93 MG/DL (ref 0.67–1.17)
DEPRECATED RDW RBC: 46.5 FL (ref 39–50)
EGFRCR SERPLBLD CKD-EPI 2021: 84 ML/MIN/{1.73_M2}
EOSINOPHIL # BLD: 0.2 K/MCL (ref 0–0.5)
EOSINOPHIL NFR BLD: 2 %
ERYTHROCYTE [DISTWIDTH] IN BLOOD: 15.5 % (ref 11–15)
FASTING DURATION TIME PATIENT: NORMAL H
GLUCOSE SERPL-MCNC: 88 MG/DL (ref 70–99)
HCT VFR BLD CALC: 39.9 % (ref 39–51)
HGB BLD-MCNC: 12.4 G/DL (ref 13–17)
IMM GRANULOCYTES # BLD AUTO: 0 K/MCL (ref 0–0.2)
IMM GRANULOCYTES # BLD: 0 %
LYMPHOCYTES # BLD: 1.8 K/MCL (ref 1–4)
LYMPHOCYTES NFR BLD: 19 %
MCH RBC QN AUTO: 25.5 PG (ref 26–34)
MCHC RBC AUTO-ENTMCNC: 31.1 G/DL (ref 32–36.5)
MCV RBC AUTO: 82.1 FL (ref 78–100)
MONOCYTES # BLD: 0.6 K/MCL (ref 0.3–0.9)
MONOCYTES NFR BLD: 6 %
NEUTROPHILS # BLD: 6.6 K/MCL (ref 1.8–7.7)
NEUTROPHILS NFR BLD: 73 %
NRBC BLD MANUAL-RTO: 0 /100 WBC
PATH REPORT.FINAL DX SPEC: NORMAL
PATH REPORT.GROSS SPEC: NORMAL
PLATELET # BLD AUTO: 174 K/MCL (ref 140–450)
POTASSIUM SERPL-SCNC: 4.3 MMOL/L (ref 3.4–5.1)
RBC # BLD: 4.86 MIL/MCL (ref 4.5–5.9)
SODIUM SERPL-SCNC: 138 MMOL/L (ref 135–145)
WBC # BLD: 9.1 K/MCL (ref 4.2–11)

## 2024-05-02 PROCEDURE — 96372 THER/PROPH/DIAG INJ SC/IM: CPT | Performed by: INTERNAL MEDICINE

## 2024-05-02 PROCEDURE — 36415 COLL VENOUS BLD VENIPUNCTURE: CPT | Performed by: SPECIALIST

## 2024-05-02 PROCEDURE — 10002800 HB RX 250 W HCPCS: Performed by: INTERNAL MEDICINE

## 2024-05-02 PROCEDURE — 99233 SBSQ HOSP IP/OBS HIGH 50: CPT | Performed by: INTERNAL MEDICINE

## 2024-05-02 PROCEDURE — 10002803 HB RX 637: Performed by: INTERNAL MEDICINE

## 2024-05-02 PROCEDURE — 80048 BASIC METABOLIC PNL TOTAL CA: CPT | Performed by: SPECIALIST

## 2024-05-02 PROCEDURE — 10004651 HB RX, NO CHARGE ITEM: Performed by: INTERNAL MEDICINE

## 2024-05-02 PROCEDURE — 85025 COMPLETE CBC W/AUTO DIFF WBC: CPT | Performed by: SPECIALIST

## 2024-05-02 RX ADMIN — TAMSULOSIN HYDROCHLORIDE 0.4 MG: 0.4 CAPSULE ORAL at 09:26

## 2024-05-02 RX ADMIN — ATORVASTATIN CALCIUM 10 MG: 10 TABLET, FILM COATED ORAL at 09:27

## 2024-05-02 RX ADMIN — ASPIRIN 81 MG: 81 TABLET, COATED ORAL at 09:26

## 2024-05-02 RX ADMIN — ENOXAPARIN SODIUM 40 MG: 100 INJECTION SUBCUTANEOUS at 14:21

## 2024-05-04 ENCOUNTER — TELEPHONE (OUTPATIENT)
Dept: CARE COORDINATION | Age: 79
End: 2024-05-04

## 2024-05-08 ENCOUNTER — PREP FOR CASE (OUTPATIENT)
Dept: CARDIOLOGY | Age: 79
End: 2024-05-08

## 2024-05-08 ENCOUNTER — OFFICE VISIT (OUTPATIENT)
Dept: CARDIOLOGY | Age: 79
End: 2024-05-08
Attending: INTERNAL MEDICINE

## 2024-05-08 VITALS
BODY MASS INDEX: 20.2 KG/M2 | SYSTOLIC BLOOD PRESSURE: 98 MMHG | DIASTOLIC BLOOD PRESSURE: 62 MMHG | HEIGHT: 65 IN | RESPIRATION RATE: 16 BRPM | WEIGHT: 121.25 LBS | HEART RATE: 78 BPM

## 2024-05-08 DIAGNOSIS — I35.0 SEVERE AORTIC STENOSIS: Primary | ICD-10-CM

## 2024-05-08 DIAGNOSIS — I35.0 NONRHEUMATIC AORTIC VALVE STENOSIS: Primary | ICD-10-CM

## 2024-05-08 DIAGNOSIS — I50.20 HFREF (HEART FAILURE WITH REDUCED EJECTION FRACTION)  (CMD): ICD-10-CM

## 2024-05-08 DIAGNOSIS — I25.10 CORONARY ARTERY DISEASE INVOLVING NATIVE CORONARY ARTERY OF NATIVE HEART WITHOUT ANGINA PECTORIS: ICD-10-CM

## 2024-05-08 LAB
ABO + RH BLD: NORMAL
ALBUMIN SERPL-MCNC: 3 G/DL (ref 3.6–5.1)
ALBUMIN/GLOB SERPL: 0.6 {RATIO} (ref 1–2.4)
ALP SERPL-CCNC: 59 UNITS/L (ref 45–117)
ALT SERPL-CCNC: 25 UNITS/L
ANION GAP SERPL CALC-SCNC: 7 MMOL/L (ref 7–19)
AST SERPL-CCNC: 19 UNITS/L
ATRIAL RATE (BPM): 78
BASOPHILS # BLD: 0 K/MCL (ref 0–0.3)
BASOPHILS NFR BLD: 0 %
BILIRUB SERPL-MCNC: 0.5 MG/DL (ref 0.2–1)
BLD GP AB SCN SERPL QL GEL: NEGATIVE
BUN SERPL-MCNC: 18 MG/DL (ref 6–20)
BUN/CREAT SERPL: 16 (ref 7–25)
CALCIUM SERPL-MCNC: 9 MG/DL (ref 8.4–10.2)
CHLORIDE SERPL-SCNC: 103 MMOL/L (ref 97–110)
CO2 SERPL-SCNC: 30 MMOL/L (ref 21–32)
CREAT SERPL-MCNC: 1.1 MG/DL (ref 0.67–1.17)
DEPRECATED RDW RBC: 47.2 FL (ref 39–50)
EGFRCR SERPLBLD CKD-EPI 2021: 69 ML/MIN/{1.73_M2}
EOSINOPHIL # BLD: 0.1 K/MCL (ref 0–0.5)
EOSINOPHIL NFR BLD: 1 %
ERYTHROCYTE [DISTWIDTH] IN BLOOD: 15.6 % (ref 11–15)
FASTING DURATION TIME PATIENT: ABNORMAL H
GLOBULIN SER-MCNC: 4.7 G/DL (ref 2–4)
GLUCOSE SERPL-MCNC: 112 MG/DL (ref 70–99)
HCT VFR BLD CALC: 47.5 % (ref 39–51)
HGB BLD-MCNC: 14.3 G/DL (ref 13–17)
IMM GRANULOCYTES # BLD AUTO: 0 K/MCL (ref 0–0.2)
IMM GRANULOCYTES # BLD: 0 %
LYMPHOCYTES # BLD: 2 K/MCL (ref 1–4)
LYMPHOCYTES NFR BLD: 19 %
MCH RBC QN AUTO: 25.1 PG (ref 26–34)
MCHC RBC AUTO-ENTMCNC: 30.1 G/DL (ref 32–36.5)
MCV RBC AUTO: 83.3 FL (ref 78–100)
MONOCYTES # BLD: 0.5 K/MCL (ref 0.3–0.9)
MONOCYTES NFR BLD: 5 %
NEUTROPHILS # BLD: 7.7 K/MCL (ref 1.8–7.7)
NEUTROPHILS NFR BLD: 75 %
NRBC BLD MANUAL-RTO: 0 /100 WBC
NT-PROBNP SERPL-MCNC: 6617 PG/ML
P AXIS (DEGREES): 13
PLATELET # BLD AUTO: 247 K/MCL (ref 140–450)
POTASSIUM SERPL-SCNC: 3.6 MMOL/L (ref 3.4–5.1)
PR-INTERVAL (MSEC): 140
PROT SERPL-MCNC: 7.7 G/DL (ref 6.4–8.2)
QRS-INTERVAL (MSEC): 168
QT-INTERVAL (MSEC): 466
QTC: 531
R AXIS (DEGREES): 9
RBC # BLD: 5.7 MIL/MCL (ref 4.5–5.9)
REPORT TEXT: NORMAL
SODIUM SERPL-SCNC: 136 MMOL/L (ref 135–145)
T AXIS (DEGREES): -171
TYPE AND SCREEN EXPIRATION DATE: NORMAL
VENTRICULAR RATE EKG/MIN (BPM): 78
WBC # BLD: 10.3 K/MCL (ref 4.2–11)

## 2024-05-08 PROCEDURE — 3078F DIAST BP <80 MM HG: CPT | Performed by: NURSE PRACTITIONER

## 2024-05-08 PROCEDURE — 36415 COLL VENOUS BLD VENIPUNCTURE: CPT | Performed by: NURSE PRACTITIONER

## 2024-05-08 PROCEDURE — 93005 ELECTROCARDIOGRAM TRACING: CPT | Performed by: NURSE PRACTITIONER

## 2024-05-08 PROCEDURE — 99212 OFFICE O/P EST SF 10 MIN: CPT

## 2024-05-08 PROCEDURE — 80053 COMPREHEN METABOLIC PANEL: CPT | Performed by: NURSE PRACTITIONER

## 2024-05-08 PROCEDURE — 99215 OFFICE O/P EST HI 40 MIN: CPT | Performed by: NURSE PRACTITIONER

## 2024-05-08 PROCEDURE — 3074F SYST BP LT 130 MM HG: CPT | Performed by: NURSE PRACTITIONER

## 2024-05-08 PROCEDURE — 85025 COMPLETE CBC W/AUTO DIFF WBC: CPT | Performed by: NURSE PRACTITIONER

## 2024-05-08 PROCEDURE — 86900 BLOOD TYPING SEROLOGIC ABO: CPT | Performed by: NURSE PRACTITIONER

## 2024-05-08 PROCEDURE — 83880 ASSAY OF NATRIURETIC PEPTIDE: CPT | Performed by: NURSE PRACTITIONER

## 2024-05-08 RX ORDER — 0.9 % SODIUM CHLORIDE 0.9 %
2 VIAL (ML) INJECTION EVERY 12 HOURS SCHEDULED
OUTPATIENT
Start: 2024-05-08

## 2024-05-11 ENCOUNTER — TELEPHONE (OUTPATIENT)
Dept: CARE COORDINATION | Age: 79
End: 2024-05-11

## 2024-05-12 ENCOUNTER — TELEPHONE (OUTPATIENT)
Dept: CARE COORDINATION | Age: 79
End: 2024-05-12

## 2024-05-18 ENCOUNTER — TELEPHONE (OUTPATIENT)
Dept: CARE COORDINATION | Age: 79
End: 2024-05-18

## 2024-05-19 ENCOUNTER — TELEPHONE (OUTPATIENT)
Dept: CARE COORDINATION | Age: 79
End: 2024-05-19

## 2024-05-20 ENCOUNTER — HOSPITAL ENCOUNTER (OUTPATIENT)
Dept: CARDIOLOGY | Age: 79
Discharge: HOME | DRG: 267 | End: 2024-05-20
Attending: NURSE PRACTITIONER

## 2024-05-20 ENCOUNTER — ANESTHESIA EVENT (OUTPATIENT)
Dept: CARDIOLOGY | Age: 79
DRG: 267 | End: 2024-05-20

## 2024-05-20 ENCOUNTER — ANESTHESIA (OUTPATIENT)
Dept: CARDIOLOGY | Age: 79
DRG: 267 | End: 2024-05-20

## 2024-05-20 ENCOUNTER — APPOINTMENT (OUTPATIENT)
Dept: CARDIOLOGY | Age: 79
DRG: 267 | End: 2024-05-20
Attending: INTERNAL MEDICINE

## 2024-05-20 ENCOUNTER — HOSPITAL ENCOUNTER (INPATIENT)
Age: 79
LOS: 1 days | Discharge: HOME | DRG: 267 | End: 2024-05-21
Attending: INTERNAL MEDICINE | Admitting: INTERNAL MEDICINE

## 2024-05-20 DIAGNOSIS — I35.0 SEVERE AORTIC STENOSIS: ICD-10-CM

## 2024-05-20 DIAGNOSIS — Z95.2 S/P TAVR (TRANSCATHETER AORTIC VALVE REPLACEMENT): Primary | ICD-10-CM

## 2024-05-20 PROBLEM — I50.20 HEART FAILURE WITH REDUCED EJECTION FRACTION  (CMD): Status: ACTIVE | Noted: 2024-05-20

## 2024-05-20 LAB
ACT BLD: 272 BASELINE/TARGET RANGES ARE SET BY CLINICIANS FOR EACH PATIENT/PROCEDURE
AORTIC VALVE AREA: 1.27
ATRIAL RATE (BPM): 59
AV MEAN GRADIENT (AVMG): 13
AV MEAN VELOCITY (AVMV): 1.78
AV PEAK GRADIENT (AVPG): 27
AV PEAK VELOCITY (AVPV): 2.6
BLOOD EXPIRATION DATE: NORMAL
BLOOD EXPIRATION DATE: NORMAL
CROSSMATCH RESULT: NORMAL
CROSSMATCH RESULT: NORMAL
DISPENSE STATUS: NORMAL
DISPENSE STATUS: NORMAL
ISBT BLOOD TYPE: 7300
ISBT BLOOD TYPE: 7300
ISSUE DATE/TIME: NORMAL
ISSUE DATE/TIME: NORMAL
LV EF: NORMAL %
LVOT 2D (LVOTD): 18.8
LVOT VTI (LVOTVTI): 0.78
P AXIS (DEGREES): 55
PR-INTERVAL (MSEC): 162
PRODUCT CODE: NORMAL
PRODUCT CODE: NORMAL
PRODUCT DESCRIPTION: NORMAL
PRODUCT DESCRIPTION: NORMAL
PRODUCT ID: NORMAL
PRODUCT ID: NORMAL
QRS-INTERVAL (MSEC): 170
QT-INTERVAL (MSEC): 570
QTC: 565
R AXIS (DEGREES): -50
REPORT TEXT: NORMAL
RV END SYSTOLIC LONGITUDINAL STRAIN FREE WALL (RVGS): 2.3
T AXIS (DEGREES): -178
TRICUSPID VALVE PEAK REGURGITATION VELOCITY (TRPV): 3.8
UNIT BLOOD TYPE: NORMAL
UNIT BLOOD TYPE: NORMAL
UNIT NUMBER: NORMAL
UNIT NUMBER: NORMAL
VENTRICULAR RATE EKG/MIN (BPM): 59

## 2024-05-20 PROCEDURE — 93005 ELECTROCARDIOGRAM TRACING: CPT | Performed by: NURSE PRACTITIONER

## 2024-05-20 PROCEDURE — 10002807 HB RX 258: Performed by: NURSE ANESTHETIST, CERTIFIED REGISTERED

## 2024-05-20 PROCEDURE — 10002803 HB RX 637: Performed by: NURSE PRACTITIONER

## 2024-05-20 PROCEDURE — 10006023 HB SUPPLY 272: Performed by: INTERNAL MEDICINE

## 2024-05-20 PROCEDURE — 93308 TTE F-UP OR LMTD: CPT | Performed by: INTERNAL MEDICINE

## 2024-05-20 PROCEDURE — 10003585 HB ROOM CHARGE INTERMEDIATE CARE

## 2024-05-20 PROCEDURE — 10002800 HB RX 250 W HCPCS: Performed by: NURSE PRACTITIONER

## 2024-05-20 PROCEDURE — X1094 NO CHARGE VISIT: HCPCS | Performed by: INTERNAL MEDICINE

## 2024-05-20 PROCEDURE — 85347 COAGULATION TIME ACTIVATED: CPT | Performed by: INTERNAL MEDICINE

## 2024-05-20 PROCEDURE — 10002801 HB RX 250 W/O HCPCS: Performed by: NURSE ANESTHETIST, CERTIFIED REGISTERED

## 2024-05-20 PROCEDURE — 99223 1ST HOSP IP/OBS HIGH 75: CPT | Performed by: INTERNAL MEDICINE

## 2024-05-20 PROCEDURE — 93321 DOPPLER ECHO F-UP/LMTD STD: CPT | Performed by: INTERNAL MEDICINE

## 2024-05-20 PROCEDURE — 10002800 HB RX 250 W HCPCS: Performed by: NURSE ANESTHETIST, CERTIFIED REGISTERED

## 2024-05-20 PROCEDURE — 10002805 HB CONTRAST AGENT: Performed by: INTERNAL MEDICINE

## 2024-05-20 PROCEDURE — C1894 INTRO/SHEATH, NON-LASER: HCPCS | Performed by: INTERNAL MEDICINE

## 2024-05-20 PROCEDURE — 93325 DOPPLER ECHO COLOR FLOW MAPG: CPT | Performed by: INTERNAL MEDICINE

## 2024-05-20 PROCEDURE — C1760 CLOSURE DEV, VASC: HCPCS | Performed by: INTERNAL MEDICINE

## 2024-05-20 PROCEDURE — 10004452 HB PACU ADDL 30 MINUTES: Performed by: INTERNAL MEDICINE

## 2024-05-20 PROCEDURE — C1769 GUIDE WIRE: HCPCS | Performed by: INTERNAL MEDICINE

## 2024-05-20 PROCEDURE — 10006027 HB SUPPLY 278: Performed by: INTERNAL MEDICINE

## 2024-05-20 PROCEDURE — 13000008 HB ANESTHESIA MAC OUTSIDE OR: Performed by: INTERNAL MEDICINE

## 2024-05-20 PROCEDURE — 33361 REPLACE AORTIC VALVE PERQ: CPT | Performed by: INTERNAL MEDICINE

## 2024-05-20 PROCEDURE — 86923 COMPATIBILITY TEST ELECTRIC: CPT

## 2024-05-20 PROCEDURE — 99223 1ST HOSP IP/OBS HIGH 75: CPT | Performed by: NURSE PRACTITIONER

## 2024-05-20 PROCEDURE — 10002801 HB RX 250 W/O HCPCS: Performed by: INTERNAL MEDICINE

## 2024-05-20 PROCEDURE — 93308 TTE F-UP OR LMTD: CPT

## 2024-05-20 PROCEDURE — 93010 ELECTROCARDIOGRAM REPORT: CPT | Performed by: INTERNAL MEDICINE

## 2024-05-20 PROCEDURE — 10002807 HB RX 258: Performed by: INTERNAL MEDICINE

## 2024-05-20 PROCEDURE — 10004451 HB PACU RECOVERY 1ST 30 MINUTES: Performed by: INTERNAL MEDICINE

## 2024-05-20 PROCEDURE — 02RF38Z REPLACEMENT OF AORTIC VALVE WITH ZOOPLASTIC TISSUE, PERCUTANEOUS APPROACH: ICD-10-PCS | Performed by: INTERNAL MEDICINE

## 2024-05-20 DEVICE — VALVE AOR SAPIEN 3 ULTRA RESILIA 23MM: Type: IMPLANTABLE DEVICE | Site: AORTIC VALVE | Status: FUNCTIONAL

## 2024-05-20 DEVICE — SAPIEN 3 ULTRA RESILIA TRANSCATHETER HEART VALVE, 23MM
Type: IMPLANTABLE DEVICE | Site: AORTIC VALVE | Status: FUNCTIONAL
Brand: SAPIEN 3 ULTRA RESILIA

## 2024-05-20 DEVICE — PERCLOSE™ PROSTYLE™ SUTURE-MEDIATED CLOSURE AND REPAIR SYSTEM
Type: IMPLANTABLE DEVICE | Site: GROIN | Status: FUNCTIONAL
Brand: PERCLOSE™ PROSTYLE™

## 2024-05-20 RX ORDER — LIDOCAINE HYDROCHLORIDE 20 MG/ML
INJECTION, SOLUTION EPIDURAL; INFILTRATION; INTRACAUDAL; PERINEURAL PRN
Status: DISCONTINUED | OUTPATIENT
Start: 2024-05-20 | End: 2024-05-20 | Stop reason: HOSPADM

## 2024-05-20 RX ORDER — 0.9 % SODIUM CHLORIDE 0.9 %
2 VIAL (ML) INJECTION EVERY 12 HOURS SCHEDULED
Status: DISCONTINUED | OUTPATIENT
Start: 2024-05-20 | End: 2024-05-20 | Stop reason: HOSPADM

## 2024-05-20 RX ORDER — ONDANSETRON 2 MG/ML
4 INJECTION INTRAMUSCULAR; INTRAVENOUS 2 TIMES DAILY PRN
Status: DISCONTINUED | OUTPATIENT
Start: 2024-05-20 | End: 2024-05-20 | Stop reason: HOSPADM

## 2024-05-20 RX ORDER — FUROSEMIDE 40 MG/1
40 TABLET ORAL DAILY
Status: DISCONTINUED | OUTPATIENT
Start: 2024-05-21 | End: 2024-05-21 | Stop reason: HOSPADM

## 2024-05-20 RX ORDER — SODIUM CHLORIDE 9 MG/ML
INJECTION, SOLUTION INTRAVENOUS CONTINUOUS PRN
Status: DISCONTINUED | OUTPATIENT
Start: 2024-05-20 | End: 2024-05-20

## 2024-05-20 RX ORDER — POLYETHYLENE GLYCOL 3350 17 G/17G
17 POWDER, FOR SOLUTION ORAL DAILY PRN
Status: DISCONTINUED | OUTPATIENT
Start: 2024-05-20 | End: 2024-05-21 | Stop reason: HOSPADM

## 2024-05-20 RX ORDER — PROTAMINE SULFATE 10 MG/ML
INJECTION, SOLUTION INTRAVENOUS PRN
Status: DISCONTINUED | OUTPATIENT
Start: 2024-05-20 | End: 2024-05-20

## 2024-05-20 RX ORDER — SODIUM CHLORIDE 9 MG/ML
INJECTION, SOLUTION INTRAVENOUS CONTINUOUS
Status: DISCONTINUED | OUTPATIENT
Start: 2024-05-20 | End: 2024-05-21 | Stop reason: HOSPADM

## 2024-05-20 RX ORDER — DROPERIDOL 2.5 MG/ML
0.62 INJECTION, SOLUTION INTRAMUSCULAR; INTRAVENOUS
Status: DISCONTINUED | OUTPATIENT
Start: 2024-05-20 | End: 2024-05-20 | Stop reason: HOSPADM

## 2024-05-20 RX ORDER — ATORVASTATIN CALCIUM 10 MG/1
10 TABLET, FILM COATED ORAL NIGHTLY
Status: DISCONTINUED | OUTPATIENT
Start: 2024-05-20 | End: 2024-05-21

## 2024-05-20 RX ORDER — CLOPIDOGREL BISULFATE 75 MG/1
75 TABLET ORAL DAILY
Status: DISCONTINUED | OUTPATIENT
Start: 2024-05-21 | End: 2024-05-21 | Stop reason: HOSPADM

## 2024-05-20 RX ORDER — ASPIRIN 81 MG/1
81 TABLET ORAL DAILY
Status: DISCONTINUED | OUTPATIENT
Start: 2024-05-21 | End: 2024-05-21 | Stop reason: HOSPADM

## 2024-05-20 RX ORDER — PROPOFOL 10 MG/ML
INJECTION, EMULSION INTRAVENOUS PRN
Status: DISCONTINUED | OUTPATIENT
Start: 2024-05-20 | End: 2024-05-20

## 2024-05-20 RX ORDER — ACETAMINOPHEN 325 MG/1
650 TABLET ORAL EVERY 4 HOURS PRN
Status: DISCONTINUED | OUTPATIENT
Start: 2024-05-20 | End: 2024-05-21 | Stop reason: HOSPADM

## 2024-05-20 RX ORDER — SODIUM CHLORIDE 9 MG/ML
INJECTION, SOLUTION INTRAVENOUS CONTINUOUS PRN
Status: DISCONTINUED | OUTPATIENT
Start: 2024-05-20 | End: 2024-05-21

## 2024-05-20 RX ORDER — TAMSULOSIN HYDROCHLORIDE 0.4 MG/1
0.4 CAPSULE ORAL DAILY
Status: DISCONTINUED | OUTPATIENT
Start: 2024-05-20 | End: 2024-05-21 | Stop reason: HOSPADM

## 2024-05-20 RX ADMIN — EPINEPHRINE 1 MCG/MIN: 1 INJECTION, SOLUTION, CONCENTRATE INTRAVENOUS at 12:45

## 2024-05-20 RX ADMIN — PROTAMINE SULFATE 25 MG: 10 INJECTION, SOLUTION INTRAVENOUS at 13:42

## 2024-05-20 RX ADMIN — SODIUM CHLORIDE: 9 INJECTION, SOLUTION INTRAVENOUS at 09:40

## 2024-05-20 RX ADMIN — FENTANYL CITRATE 25 MCG: 50 INJECTION INTRAMUSCULAR; INTRAVENOUS at 12:27

## 2024-05-20 RX ADMIN — FENTANYL CITRATE 25 MCG: 50 INJECTION INTRAMUSCULAR; INTRAVENOUS at 12:54

## 2024-05-20 RX ADMIN — SODIUM CHLORIDE: 9 INJECTION, SOLUTION INTRAVENOUS at 12:19

## 2024-05-20 RX ADMIN — PROPOFOL 50 MG: 10 INJECTION, EMULSION INTRAVENOUS at 12:44

## 2024-05-20 RX ADMIN — PROPOFOL 20 MG: 10 INJECTION, EMULSION INTRAVENOUS at 12:27

## 2024-05-20 RX ADMIN — PROPOFOL 50 MG: 10 INJECTION, EMULSION INTRAVENOUS at 12:35

## 2024-05-20 RX ADMIN — PHENYLEPHRINE HYDROCHLORIDE 10 MCG/MIN: 10 INJECTION INTRAVENOUS at 12:28

## 2024-05-20 RX ADMIN — CEFAZOLIN SODIUM 2000 MG: 300 INJECTION, POWDER, LYOPHILIZED, FOR SOLUTION INTRAVENOUS at 12:36

## 2024-05-20 RX ADMIN — PROPOFOL INJECTABLE EMULSION 100 MCG/KG/MIN: 10 INJECTION, EMULSION INTRAVENOUS at 12:25

## 2024-05-20 RX ADMIN — HEPARIN SODIUM 4500 UNITS: 1000 INJECTION INTRAVENOUS; SUBCUTANEOUS at 13:16

## 2024-05-20 RX ADMIN — ATORVASTATIN CALCIUM 10 MG: 10 TABLET, FILM COATED ORAL at 21:40

## 2024-05-20 RX ADMIN — SODIUM CHLORIDE: 9 INJECTION, SOLUTION INTRAVENOUS at 09:30

## 2024-05-20 ASSESSMENT — PAIN SCALES - GENERAL
PAINLEVEL_OUTOF10: 0

## 2024-05-21 ENCOUNTER — APPOINTMENT (OUTPATIENT)
Dept: CARDIOLOGY | Age: 79
DRG: 267 | End: 2024-05-21
Attending: NURSE PRACTITIONER

## 2024-05-21 ENCOUNTER — APPOINTMENT (OUTPATIENT)
Dept: GENERAL RADIOLOGY | Age: 79
DRG: 267 | End: 2024-05-21
Attending: INTERNAL MEDICINE

## 2024-05-21 VITALS
RESPIRATION RATE: 16 BRPM | BODY MASS INDEX: 18.57 KG/M2 | HEIGHT: 66 IN | WEIGHT: 115.52 LBS | SYSTOLIC BLOOD PRESSURE: 98 MMHG | OXYGEN SATURATION: 96 % | TEMPERATURE: 98.2 F | HEART RATE: 68 BPM | DIASTOLIC BLOOD PRESSURE: 44 MMHG

## 2024-05-21 LAB
ANION GAP SERPL CALC-SCNC: 9 MMOL/L (ref 7–19)
ATRIAL RATE (BPM): 67
ATRIAL RATE (BPM): 68
AV MEAN GRADIENT (AVMG): 20
AV MEAN VELOCITY (AVMV): 2.04
AV PEAK GRADIENT (AVPG): 38
AV PEAK VELOCITY (AVPV): 3.1
BUN SERPL-MCNC: 18 MG/DL (ref 6–20)
BUN/CREAT SERPL: 19 (ref 7–25)
CALCIUM SERPL-MCNC: 8.3 MG/DL (ref 8.4–10.2)
CHLORIDE SERPL-SCNC: 108 MMOL/L (ref 97–110)
CO2 SERPL-SCNC: 22 MMOL/L (ref 21–32)
CREAT SERPL-MCNC: 0.96 MG/DL (ref 0.67–1.17)
DEPRECATED RDW RBC: 47.8 FL (ref 39–50)
EGFRCR SERPLBLD CKD-EPI 2021: 81 ML/MIN/{1.73_M2}
ERYTHROCYTE [DISTWIDTH] IN BLOOD: 15.9 % (ref 11–15)
FASTING DURATION TIME PATIENT: ABNORMAL H
GLUCOSE SERPL-MCNC: 84 MG/DL (ref 70–99)
HCT VFR BLD CALC: 40.5 % (ref 39–51)
HGB BLD-MCNC: 12.7 G/DL (ref 13–17)
INTERVENTRICULAR SEPTUM IN END DIASTOLE (IVSD): 1.83
LV EF: NORMAL %
LVOT 2D (LVOTD): 23
LVOT VTI (LVOTVTI): 1.06
MAGNESIUM SERPL-MCNC: 2 MG/DL (ref 1.7–2.4)
MCH RBC QN AUTO: 25.7 PG (ref 26–34)
MCHC RBC AUTO-ENTMCNC: 31.4 G/DL (ref 32–36.5)
MCV RBC AUTO: 82 FL (ref 78–100)
NRBC BLD MANUAL-RTO: 0 /100 WBC
P AXIS (DEGREES): 30
P AXIS (DEGREES): 33
PLATELET # BLD AUTO: 143 K/MCL (ref 140–450)
POTASSIUM SERPL-SCNC: 4.2 MMOL/L (ref 3.4–5.1)
PR-INTERVAL (MSEC): 146
PR-INTERVAL (MSEC): 150
QRS-INTERVAL (MSEC): 156
QRS-INTERVAL (MSEC): 156
QT-INTERVAL (MSEC): 500
QT-INTERVAL (MSEC): 502
QTC: 530
QTC: 532
R AXIS (DEGREES): -23
R AXIS (DEGREES): -24
RBC # BLD: 4.94 MIL/MCL (ref 4.5–5.9)
REPORT TEXT: NORMAL
REPORT TEXT: NORMAL
SODIUM SERPL-SCNC: 135 MMOL/L (ref 135–145)
T AXIS (DEGREES): -173
T AXIS (DEGREES): -176
TRICUSPID VALVE PEAK REGURGITATION VELOCITY (TRPV): 3.8
VENTRICULAR RATE EKG/MIN (BPM): 67
VENTRICULAR RATE EKG/MIN (BPM): 68
WBC # BLD: 17.5 K/MCL (ref 4.2–11)

## 2024-05-21 PROCEDURE — 10002803 HB RX 637: Performed by: INTERNAL MEDICINE

## 2024-05-21 PROCEDURE — 13001086 HB INCENTIVE SPIROMETER W INSTRUCT

## 2024-05-21 PROCEDURE — 93010 ELECTROCARDIOGRAM REPORT: CPT | Performed by: INTERNAL MEDICINE

## 2024-05-21 PROCEDURE — 93325 DOPPLER ECHO COLOR FLOW MAPG: CPT | Performed by: INTERNAL MEDICINE

## 2024-05-21 PROCEDURE — 10002803 HB RX 637: Performed by: NURSE PRACTITIONER

## 2024-05-21 PROCEDURE — 85027 COMPLETE CBC AUTOMATED: CPT | Performed by: NURSE PRACTITIONER

## 2024-05-21 PROCEDURE — 10002019 HB COUNTER RESP ASSESSMENT

## 2024-05-21 PROCEDURE — 99233 SBSQ HOSP IP/OBS HIGH 50: CPT | Performed by: INTERNAL MEDICINE

## 2024-05-21 PROCEDURE — 93321 DOPPLER ECHO F-UP/LMTD STD: CPT | Performed by: INTERNAL MEDICINE

## 2024-05-21 PROCEDURE — 93308 TTE F-UP OR LMTD: CPT

## 2024-05-21 PROCEDURE — 93308 TTE F-UP OR LMTD: CPT | Performed by: INTERNAL MEDICINE

## 2024-05-21 PROCEDURE — 36415 COLL VENOUS BLD VENIPUNCTURE: CPT | Performed by: NURSE PRACTITIONER

## 2024-05-21 PROCEDURE — 10004180 HB COUNTER-TRANSPORT

## 2024-05-21 PROCEDURE — 93005 ELECTROCARDIOGRAM TRACING: CPT | Performed by: NURSE PRACTITIONER

## 2024-05-21 PROCEDURE — 10002016 HB COUNTER INCENTIVE SPIROMETRY

## 2024-05-21 PROCEDURE — 99223 1ST HOSP IP/OBS HIGH 75: CPT | Performed by: INTERNAL MEDICINE

## 2024-05-21 PROCEDURE — 10004651 HB RX, NO CHARGE ITEM: Performed by: NURSE PRACTITIONER

## 2024-05-21 PROCEDURE — 83735 ASSAY OF MAGNESIUM: CPT | Performed by: NURSE PRACTITIONER

## 2024-05-21 PROCEDURE — 71045 X-RAY EXAM CHEST 1 VIEW: CPT

## 2024-05-21 PROCEDURE — 80048 BASIC METABOLIC PNL TOTAL CA: CPT | Performed by: NURSE PRACTITIONER

## 2024-05-21 RX ORDER — AMOXICILLIN 500 MG/1
2000 TABLET, FILM COATED ORAL
Qty: 4 TABLET | Refills: 11 | Status: SHIPPED | OUTPATIENT
Start: 2024-05-21 | End: 2024-05-21

## 2024-05-21 RX ORDER — ATORVASTATIN CALCIUM 10 MG/1
10 TABLET, FILM COATED ORAL DAILY
Status: DISCONTINUED | OUTPATIENT
Start: 2024-05-21 | End: 2024-05-21 | Stop reason: HOSPADM

## 2024-05-21 RX ORDER — CLOPIDOGREL BISULFATE 75 MG/1
75 TABLET ORAL DAILY
Qty: 90 TABLET | Refills: 0 | Status: SHIPPED | OUTPATIENT
Start: 2024-05-21

## 2024-05-21 RX ORDER — CLOPIDOGREL BISULFATE 75 MG/1
75 TABLET ORAL DAILY
Qty: 90 TABLET | Refills: 0 | Status: SHIPPED | OUTPATIENT
Start: 2024-05-21 | End: 2024-05-21

## 2024-05-21 RX ORDER — AMOXICILLIN 500 MG/1
2000 TABLET, FILM COATED ORAL
Qty: 4 TABLET | Refills: 11 | Status: SHIPPED | OUTPATIENT
Start: 2024-05-21

## 2024-05-21 RX ADMIN — ASPIRIN 81 MG: 81 TABLET, COATED ORAL at 09:10

## 2024-05-21 RX ADMIN — ATORVASTATIN CALCIUM 10 MG: 10 TABLET, FILM COATED ORAL at 09:10

## 2024-05-21 RX ADMIN — FUROSEMIDE 40 MG: 40 TABLET ORAL at 09:10

## 2024-05-21 RX ADMIN — CLOPIDOGREL BISULFATE 75 MG: 75 TABLET, FILM COATED ORAL at 09:10

## 2024-05-21 SDOH — ECONOMIC STABILITY: HOUSING INSECURITY: WHAT IS YOUR LIVING SITUATION TODAY?: I HAVE A STEADY PLACE TO LIVE

## 2024-05-21 ASSESSMENT — COGNITIVE AND FUNCTIONAL STATUS - GENERAL
DO YOU HAVE DIFFICULTY DRESSING OR BATHING: NO
DO YOU HAVE SERIOUS DIFFICULTY WALKING OR CLIMBING STAIRS: NO

## 2024-05-21 ASSESSMENT — PAIN SCALES - GENERAL
PAINLEVEL_OUTOF10: 0

## 2024-05-21 ASSESSMENT — PULMONARY FUNCTION TESTS
FEV1: 0
FEV1_PREDICTED: 0
FEV1: 0
FEV1: 0
FEV1/FVC: UNABLE TO OBTAIN, OR GREATER THAN 70%

## 2024-05-23 ENCOUNTER — TELEPHONE (OUTPATIENT)
Dept: CARE COORDINATION | Age: 79
End: 2024-05-23

## 2024-05-23 ENCOUNTER — CASE MANAGEMENT (OUTPATIENT)
Dept: CARE COORDINATION | Age: 79
End: 2024-05-23

## 2024-05-25 ENCOUNTER — TELEPHONE (OUTPATIENT)
Dept: CARE COORDINATION | Age: 79
End: 2024-05-25

## 2024-05-28 ENCOUNTER — TELEPHONE (OUTPATIENT)
Facility: CLINIC | Age: 79
End: 2024-05-28

## 2024-05-28 DIAGNOSIS — Z12.12 SCREENING FOR COLORECTAL CANCER: Primary | ICD-10-CM

## 2024-05-28 DIAGNOSIS — Z12.11 SCREENING FOR COLORECTAL CANCER: Primary | ICD-10-CM

## 2024-05-29 ENCOUNTER — OFFICE VISIT (OUTPATIENT)
Dept: CARDIOLOGY | Age: 79
End: 2024-05-29
Attending: INTERNAL MEDICINE

## 2024-05-29 VITALS
SYSTOLIC BLOOD PRESSURE: 98 MMHG | BODY MASS INDEX: 20.14 KG/M2 | DIASTOLIC BLOOD PRESSURE: 52 MMHG | HEART RATE: 63 BPM | WEIGHT: 124.78 LBS | RESPIRATION RATE: 16 BRPM

## 2024-05-29 DIAGNOSIS — Z09 HOSPITAL DISCHARGE FOLLOW-UP: Primary | ICD-10-CM

## 2024-05-29 DIAGNOSIS — I25.10 CORONARY ARTERY DISEASE INVOLVING NATIVE CORONARY ARTERY OF NATIVE HEART WITHOUT ANGINA PECTORIS: ICD-10-CM

## 2024-05-29 DIAGNOSIS — I27.20 PULMONARY HTN  (CMD): ICD-10-CM

## 2024-05-29 DIAGNOSIS — Z95.2 S/P TAVR (TRANSCATHETER AORTIC VALVE REPLACEMENT): ICD-10-CM

## 2024-05-29 DIAGNOSIS — I44.7 LBBB (LEFT BUNDLE BRANCH BLOCK): ICD-10-CM

## 2024-05-29 DIAGNOSIS — I35.0 SEVERE AORTIC STENOSIS: ICD-10-CM

## 2024-05-29 DIAGNOSIS — I50.20 HFREF (HEART FAILURE WITH REDUCED EJECTION FRACTION)  (CMD): ICD-10-CM

## 2024-05-29 PROBLEM — J90 PLEURAL EFFUSION: Status: ACTIVE | Noted: 2024-05-29

## 2024-05-29 LAB
ALBUMIN SERPL-MCNC: 2.9 G/DL (ref 3.6–5.1)
ALBUMIN/GLOB SERPL: 0.6 {RATIO} (ref 1–2.4)
ALP SERPL-CCNC: 68 UNITS/L (ref 45–117)
ALT SERPL-CCNC: 33 UNITS/L
ANION GAP SERPL CALC-SCNC: 7 MMOL/L (ref 7–19)
AST SERPL-CCNC: 32 UNITS/L
ATRIAL RATE (BPM): 63
BASOPHILS # BLD: 0.1 K/MCL (ref 0–0.3)
BASOPHILS NFR BLD: 1 %
BILIRUB SERPL-MCNC: 0.6 MG/DL (ref 0.2–1)
BUN SERPL-MCNC: 21 MG/DL (ref 6–20)
BUN/CREAT SERPL: 24 (ref 7–25)
CALCIUM SERPL-MCNC: 9 MG/DL (ref 8.4–10.2)
CHLORIDE SERPL-SCNC: 105 MMOL/L (ref 97–110)
CO2 SERPL-SCNC: 29 MMOL/L (ref 21–32)
CREAT SERPL-MCNC: 0.89 MG/DL (ref 0.67–1.17)
DEPRECATED RDW RBC: 47.4 FL (ref 39–50)
EGFRCR SERPLBLD CKD-EPI 2021: 88 ML/MIN/{1.73_M2}
EOSINOPHIL # BLD: 0.2 K/MCL (ref 0–0.5)
EOSINOPHIL NFR BLD: 1 %
ERYTHROCYTE [DISTWIDTH] IN BLOOD: 15.7 % (ref 11–15)
FASTING DURATION TIME PATIENT: ABNORMAL H
GLOBULIN SER-MCNC: 4.8 G/DL (ref 2–4)
GLUCOSE SERPL-MCNC: 102 MG/DL (ref 70–99)
HCT VFR BLD CALC: 42.1 % (ref 39–51)
HGB BLD-MCNC: 13.1 G/DL (ref 13–17)
IMM GRANULOCYTES # BLD AUTO: 0.1 K/MCL (ref 0–0.2)
IMM GRANULOCYTES # BLD: 1 %
LYMPHOCYTES # BLD: 2.3 K/MCL (ref 1–4)
LYMPHOCYTES NFR BLD: 17 %
MCH RBC QN AUTO: 25.8 PG (ref 26–34)
MCHC RBC AUTO-ENTMCNC: 31.1 G/DL (ref 32–36.5)
MCV RBC AUTO: 83 FL (ref 78–100)
MONOCYTES # BLD: 0.7 K/MCL (ref 0.3–0.9)
MONOCYTES NFR BLD: 5 %
NEUTROPHILS # BLD: 10.6 K/MCL (ref 1.8–7.7)
NEUTROPHILS NFR BLD: 75 %
NRBC BLD MANUAL-RTO: 0 /100 WBC
NT-PROBNP SERPL-MCNC: 1990 PG/ML
P AXIS (DEGREES): 43
PLATELET # BLD AUTO: 231 K/MCL (ref 140–450)
POTASSIUM SERPL-SCNC: 3.9 MMOL/L (ref 3.4–5.1)
PR-INTERVAL (MSEC): 186
PROT SERPL-MCNC: 7.7 G/DL (ref 6.4–8.2)
QRS-INTERVAL (MSEC): 166
QT-INTERVAL (MSEC): 500
QTC: 512
R AXIS (DEGREES): -17
RBC # BLD: 5.07 MIL/MCL (ref 4.5–5.9)
REPORT TEXT: NORMAL
SODIUM SERPL-SCNC: 137 MMOL/L (ref 135–145)
T AXIS (DEGREES): 167
VENTRICULAR RATE EKG/MIN (BPM): 63
WBC # BLD: 14 K/MCL (ref 4.2–11)

## 2024-05-29 PROCEDURE — 99214 OFFICE O/P EST MOD 30 MIN: CPT | Performed by: NURSE PRACTITIONER

## 2024-05-29 PROCEDURE — 3078F DIAST BP <80 MM HG: CPT | Performed by: NURSE PRACTITIONER

## 2024-05-29 PROCEDURE — 93005 ELECTROCARDIOGRAM TRACING: CPT | Performed by: NURSE PRACTITIONER

## 2024-05-29 PROCEDURE — 83880 ASSAY OF NATRIURETIC PEPTIDE: CPT | Performed by: NURSE PRACTITIONER

## 2024-05-29 PROCEDURE — 3074F SYST BP LT 130 MM HG: CPT | Performed by: NURSE PRACTITIONER

## 2024-05-29 PROCEDURE — 99211 OFF/OP EST MAY X REQ PHY/QHP: CPT

## 2024-05-29 PROCEDURE — 36415 COLL VENOUS BLD VENIPUNCTURE: CPT | Performed by: NURSE PRACTITIONER

## 2024-05-29 PROCEDURE — 85025 COMPLETE CBC W/AUTO DIFF WBC: CPT | Performed by: NURSE PRACTITIONER

## 2024-05-29 PROCEDURE — 80053 COMPREHEN METABOLIC PANEL: CPT | Performed by: NURSE PRACTITIONER

## 2024-05-29 RX ORDER — TAMSULOSIN HYDROCHLORIDE 0.4 MG/1
0.4 CAPSULE ORAL
COMMUNITY

## 2024-05-29 RX ORDER — FUROSEMIDE 20 MG/1
20 TABLET ORAL DAILY
Status: SHIPPED | COMMUNITY
Start: 2024-05-29

## 2024-05-29 RX ORDER — CLOPIDOGREL BISULFATE 75 MG/1
75 TABLET ORAL DAILY
COMMUNITY

## 2024-05-29 RX ORDER — ASPIRIN 81 MG/1
81 TABLET ORAL DAILY
COMMUNITY

## 2024-05-29 NOTE — TELEPHONE ENCOUNTER
Patient contacted, verified and message from Dr. Block given.  Patient states he is currently driving and will have to call us back after he thinks about it.      Awaiting call back.

## 2024-05-29 NOTE — TELEPHONE ENCOUNTER
Patient calling to schedule his 5 year colonoscopy recall.    Last Procedure, Date, MD:  colonoscopy on 08/07/2018  Last Diagnosis:  1.  Small cecal polyp  2.  Sigmoid colon diverticulosis  3.  Fair colonoscopic preparation  Recalled (mth/yrs): 5 years  Sedation Used Previously:  Fentanyl and Midalozam  Last Prep Used (if known):  colyte  Quality Of Prep (if known): fair  Anticoagulants:clopidogrel  Diuretics: furosemide  Diabetic Med's (PO/Injectables): no  Weight loss Med's:no  Iron/Herbal/Multivitamin Supplements (RX/OTC):vitamin d  Marijuana/Vaping/CBD:no  Height & Weight:5'5/120  BMI:20  Hx of Cardiac/CVA Issues/(MI/Stroke):Patient had a valve replacement on 05/20/2024  Devices Pacemaker/Defibrillator/Stents:no  Respiratory Issues/Oxygen Use/DAVID/COPD:no  Issues w/ Anesthesia:no    Symptoms (Y/N): none  Symptoms Details:     Special Comments/Notes:    Please advise on orders and prep.     Thank you!      Date of Procedure:  08/07/18        Preoperative Diagnosis:  Colorectal cancer screening        Postoperative Diagnosis:  1.  Small cecal polyp  2.  Sigmoid colon diverticulosis  3.  Fair colonoscopic preparation        Procedure:    Colonoscopy with polypectomy        Surgeon:  Andrew Block M.D.        Anesthesia:  Fentanyl: 100 mcg IV  Midazolam: 7 mg IV in divided doses.  Conscious/moderate sedation was administered under my direct supervision by the endoscopy RN  Conscious Sedation time: 34 minutes  Cecal withdrawal time: 26 minutes        Brief History:  This is a 73 year old male who presents for a screening colonoscopy.  He has had no lower gastrointestinal tract symptoms or signs.  His last examination was greater than 10 years prior.        Technique:  After informed consent, the patient was placed in the left lateral recumbent position.  Digital rectal examination revealed no palpable intraluminal abnormalities.  An Olympus variable stiffness 190 series HD colonoscope was inserted into the  rectum and advanced under direct vision by following the lumen to the terminal ileum.  The colon was examined upon withdrawal in the left lateral recumbent position.       Findings:  The preparation of the colon was only fair.  There was a large amount of seeds/nut debris scattered throughout the colon which frequently occluded the endoscope suctioning channel.  These areas were irrigated and suctioned as best as possible.  The terminal ileum was examined for a few cm and visually normal.  The ileocecal valve was well preserved. The visualized colonic mucosa from the cecum to the anal verge was normal with an intact vascular pattern.  In the base of the cecum there was a 3 mm sessile polyp which was cold snare excised and retrieved via suction.  No ongoing bleeding.  Diverticula were present in the sigmoid colon with associated spasm.  There were no other colonic polyps, mass lesions, vascular anomalies or signs of inflammation seen.  Retroflexion in the rectum revealed no abnormalities.  The procedure was well tolerated without immediate complication.        Impression:  1.  Small cecal polyp  2.  Sigmoid colon diverticulosis  3.  Fair colonoscopic preparation     Recommendations:  1.  Follow-up biopsy results.  2.  Either short interval follow-up colonoscopy or additional screening measures advised in light of preparation.  3.  The examination should be performed sooner for any new symptoms or signs referable to the colon.           Andrew Block MD  8/7/2018     Final Diagnosis:      Cecal polyp; polypectomy:   Polypoid fragments of colonic mucosa demonstrating predominantly preservation of the glandular architecture, focal superficial hyperplastic mucosal changes compatible with hyperplastic polyp formation and single circumscribed mildly hyperplastic lymphoid cell aggregate present within the superficial lamina propria.  No evidence of fungal organisms, parasitic organisms, viral inclusions, epithelioid  granulomas, active/acute colitis, increased intraepithelial lymphocytes, adenoma formation, epithelial dysplasia, or malignancy identified.     Comment:  Multiple histologic level sections of the specimen (2 slides, 8 levels) were examined microscopically.

## 2024-05-29 NOTE — TELEPHONE ENCOUNTER
The patient's last colonoscopy was suboptimal with regards to preparation.  Only a hyperplastic polyp was removed.  Screening options at this point would include a Cologuard or FIT test reserving a colonoscopy for positive results or proceeding with a colonoscopy per patient preference.  If the patient wished to proceed he may schedule following a split dose TriLyte preparation and either IV sedation or monitored anesthesia care per scheduling.  I would also take 17 g daily of MiraLAX for 3 days preceding the procedure.

## 2024-05-30 ENCOUNTER — TELEPHONE (OUTPATIENT)
Dept: CARE COORDINATION | Age: 79
End: 2024-05-30

## 2024-05-31 ENCOUNTER — TELEPHONE (OUTPATIENT)
Dept: CARE COORDINATION | Age: 79
End: 2024-05-31

## 2024-06-01 ENCOUNTER — TELEPHONE (OUTPATIENT)
Dept: CARE COORDINATION | Age: 79
End: 2024-06-01

## 2024-06-06 ENCOUNTER — TELEPHONE (OUTPATIENT)
Dept: CARE COORDINATION | Age: 79
End: 2024-06-06

## 2024-06-06 NOTE — TELEPHONE ENCOUNTER
Follow up call-  Patient states he would like to do the Cologuard or FIT test first .  Please advise. Thank you.

## 2024-06-07 ENCOUNTER — TELEPHONE (OUTPATIENT)
Dept: CARE COORDINATION | Age: 79
End: 2024-06-07

## 2024-06-07 NOTE — TELEPHONE ENCOUNTER
Cologuard colon cancer screening request faxed to [phone] F.    Called and spoke to the patient, date of birth and name verified.    He was informed that I have faxed the request to [employer] for the cologuard kit.    I asked the patient to let GI know if he does not receive the kit in 2 weeks.    He agreed and verbalized understanding.

## 2024-06-10 ENCOUNTER — OFFICE VISIT (OUTPATIENT)
Dept: CARDIOLOGY | Age: 79
End: 2024-06-10

## 2024-06-10 VITALS
WEIGHT: 122.25 LBS | SYSTOLIC BLOOD PRESSURE: 127 MMHG | HEART RATE: 64 BPM | BODY MASS INDEX: 20.37 KG/M2 | OXYGEN SATURATION: 96 % | HEIGHT: 65 IN | DIASTOLIC BLOOD PRESSURE: 75 MMHG

## 2024-06-10 DIAGNOSIS — I44.7 LBBB (LEFT BUNDLE BRANCH BLOCK): ICD-10-CM

## 2024-06-10 DIAGNOSIS — I25.10 CORONARY ARTERY DISEASE INVOLVING NATIVE CORONARY ARTERY OF NATIVE HEART WITHOUT ANGINA PECTORIS: Primary | ICD-10-CM

## 2024-06-10 DIAGNOSIS — Z95.2 S/P TAVR (TRANSCATHETER AORTIC VALVE REPLACEMENT): ICD-10-CM

## 2024-06-10 DIAGNOSIS — I50.20 HFREF (HEART FAILURE WITH REDUCED EJECTION FRACTION)  (CMD): ICD-10-CM

## 2024-06-10 PROCEDURE — 3078F DIAST BP <80 MM HG: CPT | Performed by: SPECIALIST

## 2024-06-10 PROCEDURE — 99214 OFFICE O/P EST MOD 30 MIN: CPT | Performed by: SPECIALIST

## 2024-06-10 PROCEDURE — 3074F SYST BP LT 130 MM HG: CPT | Performed by: SPECIALIST

## 2024-06-10 SDOH — HEALTH STABILITY: PHYSICAL HEALTH: ON AVERAGE, HOW MANY DAYS PER WEEK DO YOU ENGAGE IN MODERATE TO STRENUOUS EXERCISE (LIKE A BRISK WALK)?: 0 DAYS

## 2024-06-10 SDOH — HEALTH STABILITY: PHYSICAL HEALTH: ON AVERAGE, HOW MANY MINUTES DO YOU ENGAGE IN EXERCISE AT THIS LEVEL?: 0 MIN

## 2024-06-13 ENCOUNTER — TELEPHONE (OUTPATIENT)
Dept: CARE COORDINATION | Age: 79
End: 2024-06-13

## 2024-06-20 ENCOUNTER — TELEPHONE (OUTPATIENT)
Dept: CARE COORDINATION | Age: 79
End: 2024-06-20

## 2024-06-27 ENCOUNTER — HOSPITAL ENCOUNTER (OUTPATIENT)
Dept: CARDIOLOGY | Age: 79
Discharge: HOME | End: 2024-06-27
Attending: NURSE PRACTITIONER

## 2024-06-27 ENCOUNTER — OFFICE VISIT (OUTPATIENT)
Dept: CARDIOLOGY | Age: 79
End: 2024-06-27
Attending: INTERNAL MEDICINE

## 2024-06-27 VITALS
SYSTOLIC BLOOD PRESSURE: 134 MMHG | BODY MASS INDEX: 21.17 KG/M2 | DIASTOLIC BLOOD PRESSURE: 72 MMHG | WEIGHT: 127.21 LBS | HEART RATE: 54 BPM | RESPIRATION RATE: 16 BRPM

## 2024-06-27 DIAGNOSIS — I35.0 SEVERE AORTIC STENOSIS: ICD-10-CM

## 2024-06-27 DIAGNOSIS — E78.5 DYSLIPIDEMIA: ICD-10-CM

## 2024-06-27 DIAGNOSIS — Z95.2 S/P TAVR (TRANSCATHETER AORTIC VALVE REPLACEMENT): ICD-10-CM

## 2024-06-27 DIAGNOSIS — Z95.2 S/P TAVR (TRANSCATHETER AORTIC VALVE REPLACEMENT): Primary | ICD-10-CM

## 2024-06-27 DIAGNOSIS — I27.20 PULMONARY HTN  (CMD): ICD-10-CM

## 2024-06-27 LAB
ALBUMIN SERPL-MCNC: 3 G/DL (ref 3.6–5.1)
ALBUMIN/GLOB SERPL: 0.6 {RATIO} (ref 1–2.4)
ALP SERPL-CCNC: 63 UNITS/L (ref 45–117)
ALT SERPL-CCNC: 22 UNITS/L
ANION GAP SERPL CALC-SCNC: 5 MMOL/L (ref 7–19)
AST SERPL-CCNC: 18 UNITS/L
ATRIAL RATE (BPM): 54
BASOPHILS # BLD: 0 K/MCL (ref 0–0.3)
BASOPHILS NFR BLD: 0 %
BILIRUB SERPL-MCNC: 0.5 MG/DL (ref 0.2–1)
BUN SERPL-MCNC: 23 MG/DL (ref 6–20)
BUN/CREAT SERPL: 23 (ref 7–25)
CALCIUM SERPL-MCNC: 9 MG/DL (ref 8.4–10.2)
CHLORIDE SERPL-SCNC: 110 MMOL/L (ref 97–110)
CO2 SERPL-SCNC: 29 MMOL/L (ref 21–32)
CREAT SERPL-MCNC: 1 MG/DL (ref 0.67–1.17)
DEPRECATED RDW RBC: 49.9 FL (ref 39–50)
EGFRCR SERPLBLD CKD-EPI 2021: 77 ML/MIN/{1.73_M2}
EOSINOPHIL # BLD: 0.2 K/MCL (ref 0–0.5)
EOSINOPHIL NFR BLD: 2 %
ERYTHROCYTE [DISTWIDTH] IN BLOOD: 16.1 % (ref 11–15)
FASTING DURATION TIME PATIENT: ABNORMAL H
GLOBULIN SER-MCNC: 4.8 G/DL (ref 2–4)
GLUCOSE SERPL-MCNC: 88 MG/DL (ref 70–99)
HCT VFR BLD CALC: 42.4 % (ref 39–51)
HGB BLD-MCNC: 13.2 G/DL (ref 13–17)
IMM GRANULOCYTES # BLD AUTO: 0.1 K/MCL (ref 0–0.2)
IMM GRANULOCYTES # BLD: 0 %
LYMPHOCYTES # BLD: 2.5 K/MCL (ref 1–4)
LYMPHOCYTES NFR BLD: 22 %
MCH RBC QN AUTO: 26.3 PG (ref 26–34)
MCHC RBC AUTO-ENTMCNC: 31.1 G/DL (ref 32–36.5)
MCV RBC AUTO: 84.6 FL (ref 78–100)
MONOCYTES # BLD: 0.7 K/MCL (ref 0.3–0.9)
MONOCYTES NFR BLD: 6 %
NEUTROPHILS # BLD: 8 K/MCL (ref 1.8–7.7)
NEUTROPHILS NFR BLD: 70 %
NRBC BLD MANUAL-RTO: 0 /100 WBC
NT-PROBNP SERPL-MCNC: 1757 PG/ML
P AXIS (DEGREES): 35
PLATELET # BLD AUTO: 155 K/MCL (ref 140–450)
POTASSIUM SERPL-SCNC: 4.2 MMOL/L (ref 3.4–5.1)
PR-INTERVAL (MSEC): 170
PROT SERPL-MCNC: 7.8 G/DL (ref 6.4–8.2)
QRS-INTERVAL (MSEC): 164
QT-INTERVAL (MSEC): 534
QTC: 506
R AXIS (DEGREES): -5
RBC # BLD: 5.01 MIL/MCL (ref 4.5–5.9)
REPORT TEXT: NORMAL
SODIUM SERPL-SCNC: 140 MMOL/L (ref 135–145)
T AXIS (DEGREES): -178
VENTRICULAR RATE EKG/MIN (BPM): 54
WBC # BLD: 11.5 K/MCL (ref 4.2–11)

## 2024-06-27 PROCEDURE — 93356 MYOCRD STRAIN IMG SPCKL TRCK: CPT

## 2024-06-27 PROCEDURE — 93010 ELECTROCARDIOGRAM REPORT: CPT | Performed by: INTERNAL MEDICINE

## 2024-06-27 PROCEDURE — 80053 COMPREHEN METABOLIC PANEL: CPT | Performed by: NURSE PRACTITIONER

## 2024-06-27 PROCEDURE — 93005 ELECTROCARDIOGRAM TRACING: CPT | Performed by: NURSE PRACTITIONER

## 2024-06-27 PROCEDURE — 3078F DIAST BP <80 MM HG: CPT | Performed by: NURSE PRACTITIONER

## 2024-06-27 PROCEDURE — 99214 OFFICE O/P EST MOD 30 MIN: CPT | Performed by: NURSE PRACTITIONER

## 2024-06-27 PROCEDURE — 99212 OFFICE O/P EST SF 10 MIN: CPT

## 2024-06-27 PROCEDURE — 3075F SYST BP GE 130 - 139MM HG: CPT | Performed by: NURSE PRACTITIONER

## 2024-06-27 PROCEDURE — 85025 COMPLETE CBC W/AUTO DIFF WBC: CPT | Performed by: NURSE PRACTITIONER

## 2024-06-27 PROCEDURE — 83880 ASSAY OF NATRIURETIC PEPTIDE: CPT | Performed by: NURSE PRACTITIONER

## 2024-06-27 PROCEDURE — 36415 COLL VENOUS BLD VENIPUNCTURE: CPT | Performed by: NURSE PRACTITIONER

## 2024-06-28 ENCOUNTER — TELEPHONE (OUTPATIENT)
Facility: CLINIC | Age: 79
End: 2024-06-28

## 2024-06-28 NOTE — TELEPHONE ENCOUNTER
Called and spoke to the patient, date of birth and name verified.    MD message relayed.    He agreed with office visit and verbalized understanding of information given.    Your Appointments      Wednesday July 31, 2024 2:20 PM  Follow Up Visit with Andrew Block MD  [facility] ([facility]) [address]  [facility] [address]  [phone]

## 2024-06-28 NOTE — TELEPHONE ENCOUNTER
Please inform the patient that his Cologuard test was positive.  A positive test result does not mean that a polyp or cancer is present, however, a polyp or cancer cannot be excluded.  I am recommending a colonoscopy to exclude a polyp or colon cancer.  He had a TAVR procedure performed in May and will be on Plavix for 90 days.  It may be preferable to proceed at the end of August with a split dose Trilyte prep and MAC.  Would obtain clearance from cardiology.  I would be happy to see the patient in the office first if he prefers or is having symptoms.

## 2024-06-28 NOTE — TELEPHONE ENCOUNTER
Dr Morales    Please see cologuard result posted below    Thank you    Letter sent for scanning

## 2024-07-31 ENCOUNTER — OFFICE VISIT (OUTPATIENT)
Facility: CLINIC | Age: 79
End: 2024-07-31
Payer: MEDICARE

## 2024-07-31 VITALS
SYSTOLIC BLOOD PRESSURE: 146 MMHG | BODY MASS INDEX: 21.52 KG/M2 | HEIGHT: 65 IN | WEIGHT: 129.19 LBS | HEART RATE: 62 BPM | DIASTOLIC BLOOD PRESSURE: 78 MMHG

## 2024-07-31 DIAGNOSIS — R19.5 POSITIVE COLORECTAL CANCER SCREENING USING COLOGUARD TEST: Primary | ICD-10-CM

## 2024-07-31 PROCEDURE — 99203 OFFICE O/P NEW LOW 30 MIN: CPT | Performed by: INTERNAL MEDICINE

## 2024-07-31 RX ORDER — EMPAGLIFLOZIN 10 MG/1
10 TABLET, FILM COATED ORAL
COMMUNITY

## 2024-08-02 ENCOUNTER — TELEPHONE (OUTPATIENT)
Facility: CLINIC | Age: 79
End: 2024-08-02

## 2024-08-02 DIAGNOSIS — R19.5 POSITIVE COLORECTAL CANCER SCREENING USING COLOGUARD TEST: Primary | ICD-10-CM

## 2024-08-02 NOTE — PATIENT INSTRUCTIONS
We will contact Dr. Villar regarding when the clopidogrel will be stopped to arrange the colonoscopy.  We will contact you after this discussion.

## 2024-08-02 NOTE — TELEPHONE ENCOUNTER
GI RNs: Please contact the patient's cardiologist's office (Dr. Antonio Villar).  Please ask when will the patient be able to hold his clopidogrel for 5 days (or discontinued).

## 2024-08-02 NOTE — PROGRESS NOTES
Subjective:   Patient ID: Jian Bowen is a 79 year old male.    HPI  The patient returns in follow-up.  He was last seen at the time of colonoscopy in August 2018.    As per previous notes the patient underwent a screening colonoscopy in August 2018.  He was found to have a small non-adenomatous polyp, uncomplicated sigmoid colon diverticulosis and a fair colonoscopic preparation.  He was advised to pursue either a short interval colonoscopy or an alternative screening method in light of preparation.    Recent/current history:  The patient underwent Cologuard testing in June which was positive.    The patient has had significant cardiovascular issues recently.  He was diagnosed with a low ejection fraction of 25% and severe aortic stenosis with pleural effusions that required thoracentesis.  He underwent a TAVR in May which was quite successful.  He has no cardiopulmonary limitations, has had no fluid overload and his ejection fraction has improved.  Of note he was advised to continue dual antiplatelet therapy for 3 months.    The patient's appetite is \"really good\".  He denies nausea, vomiting, dysphagia, dyne aphasia or heartburn.  He has no abdominal pain.  He would typically have a bowel movement daily now he has a bowel movement every other day.  He noted black stool on 1 occasion.      History/Other:   Review of Systems  See above    Wt Readings from Last 6 Encounters:   07/31/24 129 lb 3.2 oz (58.6 kg)   03/13/24 130 lb (59 kg)   03/07/24 130 lb (59 kg)   10/05/21 130 lb (59 kg)   07/15/21 130 lb (59 kg)   06/03/21 135 lb (61.2 kg)       Current Outpatient Medications   Medication Sig Dispense Refill    JARDIANCE 10 MG Oral Tab Take 1 tablet (10 mg total) by mouth.      tamsulosin 0.4 MG Oral Cap Take 1 capsule (0.4 mg total) by mouth.      clopidogrel 75 MG Oral Tab Take 1 tablet (75 mg total) by mouth daily.      aspirin 81 MG Oral Tab EC Take 1 tablet (81 mg total) by mouth daily.      ergocalciferol  1.25 MG (34673 UT) Oral Cap       atorvastatin 10 MG Oral Tab       furosemide 20 MG Oral Tab Take 1 tablet (20 mg total) by mouth 2 (two) times daily. 14 tablet 0    latanoprost 0.005 % Ophthalmic Solution       lisinopril 2.5 MG Oral Tab  (Patient not taking: Reported on 10/5/2021)      Meclizine HCl 25 MG Oral Tab Take 1 tablet (25 mg total) by mouth every 6 (six) hours as needed for Dizziness. (Patient not taking: Reported on 10/5/2021) 20 tablet 0    AmLODIPine Besylate 5 MG Oral Tab   2    lisinopril 20 MG Oral Tab  (Patient not taking: Reported on 10/5/2021)  0    Metoprolol Succinate ER 25 MG Oral Tablet 24 Hr Take 25 mg by mouth daily. (Patient not taking: Reported on 10/5/2021)      Meclizine HCl 25 MG Oral Tab Take 1 tablet (25 mg total) by mouth 3 (three) times daily as needed for Dizziness. (Patient not taking: Reported on 10/5/2021) 30 tablet 0    Lisinopril-Hydrochlorothiazide 10-12.5 MG Oral Tab Take 1 tablet by mouth daily. (Patient not taking: Reported on 10/5/2021)       Allergies:No Known Allergies    Objective:   Physical Exam  Vitals and nursing note reviewed.   Constitutional:       General: He is not in acute distress.     Appearance: He is well-developed. He is not ill-appearing, toxic-appearing or diaphoretic.   HENT:      Mouth/Throat:      Pharynx: No oropharyngeal exudate.   Eyes:      General: No scleral icterus.     Conjunctiva/sclera: Conjunctivae normal.   Neck:      Thyroid: No thyromegaly.   Cardiovascular:      Rate and Rhythm: Normal rate and regular rhythm.      Heart sounds: Normal heart sounds. No murmur heard.  Pulmonary:      Effort: Pulmonary effort is normal. No respiratory distress.      Breath sounds: Normal breath sounds. No wheezing or rales.      Comments: Lung fields clear  Abdominal:      General: Bowel sounds are normal. There is no distension.      Palpations: Abdomen is soft. There is no mass.      Tenderness: There is no abdominal tenderness. There is no  guarding or rebound.   Musculoskeletal:      Cervical back: Neck supple.   Lymphadenopathy:      Cervical: No cervical adenopathy.   Neurological:      Mental Status: He is alert and oriented to person, place, and time.   Psychiatric:         Behavior: Behavior normal.       Component  Ref Range & Units 6/27/24  1:42 PM   WBC  4.2 - 11.0 K/mcL 11.5 High    RBC  4.50 - 5.90 mil/mcL 5.01   HGB  13.0 - 17.0 g/dL 13.2   HCT  39.0 - 51.0 % 42.4   MCV  78.0 - 100.0 fl 84.6   MCH  26.0 - 34.0 pg 26.3   MCHC  32.0 - 36.5 g/dL 31.1 Low    RDW-CV  11.0 - 15.0 % 16.1 High    RDW-SD  39.0 - 50.0 fL 49.9   PLT  140 - 450 K/mcL 155   NRBC  <=0 /100 WBC 0   Neutrophil, Percent  % 70   Lymphocytes, Percent  % 22   Mono, Percent  % 6   Eosinophils, Percent  % 2   Basophils, Percent  % 0   Immature Granulocytes  % 0   Absolute Neutrophils  1.8 - 7.7 K/mcL 8.0 High    Absolute Lymphocytes  1.0 - 4.0 K/mcL 2.5   Absolute Monocytes  0.3 - 0.9 K/mcL 0.7   Absolute Eosinophils  0.0 - 0.5 K/mcL 0.2   Absolute Basophils  0.0 - 0.3 K/mcL 0.0   Absolute Immature Granulocytes  0.0 - 0.2 K/mcL 0.1   Resulting Agency [facility]     Specimen Collected: 06/27/24  1:42 PM    Performed by: [facility] Last Resulted: 06/27/24  1:52 PM   Received From: [facility]  Result Received: 07/31/24  2:13 PM       Component  Ref Range & Units 5/29/24 11:46 AM   Fasting Status    Sodium  135 - 145 mmol/L 137   Potassium  3.4 - 5.1 mmol/L 3.9   Chloride  97 - 110 mmol/L 105   Carbon Dioxide  21 - 32 mmol/L 29   Anion Gap  7 - 19 mmol/L 7   Glucose  70 - 99 mg/dL 102 High    BUN  6 - 20 mg/dL 21 High    Creatinine  0.67 - 1.17 mg/dL 0.89   Glomerular Filtration Rate  >=60 88   Comment: eGFR results = or >60 mL/min/1.73m2 = Normal kidney function. Estimated GFR calculated using the CKD-EPI-R (2021) equation that does not include race in the creatinine calculation.   BUN/Cr  7 - 25 24   Calcium  8.4 - 10.2 mg/dL  9.0   Bilirubin, Total  0.2 - 1.0 mg/dL 0.6   GOT/AST  <=37 Units/L 32   GPT/ALT  <64 Units/L 33   Alkaline Phosphatase  45 - 117 Units/L 68   Albumin  3.6 - 5.1 g/dL 2.9 Low    Protein, Total  6.4 - 8.2 g/dL 7.7   Globulin  2.0 - 4.0 g/dL 4.8 High    A/G Ratio  1.0 - 2.4 0.6 Low    Resulting Agency [facility]     Specimen Collected: 05/29/24 11:46 AM    Performed by: [facility] Last Resulted: 05/29/24 12:34 PM   Received From: [facility]  Result Received: 07/31/24  2:13 PM       Assessment & Plan:   1. Positive colorectal cancer screening using Cologuard test    In the course of routine health maintenance the patient was found to have a positive Cologuard test.  His last colonoscopy was in 2018 and negative although preparation was only fair.  I am recommending a colonoscopy.  We will query the patient's cardiologist as to whether his clopidogrel will be stopped at the end of August.  If so we will arrange a colonoscopy in September/October following a split dose TriLyte preparation and monitored anesthesia care.  Strict low residue diet for at least 72 hours preceding the procedure.  I would also recommend either Dulcolax or MiraLAX for 3 days preceding the colonoscopy to augment the preparation.        Meds This Visit:  Requested Prescriptions      No prescriptions requested or ordered in this encounter       Imaging & Referrals:  None

## 2024-08-06 ENCOUNTER — EXTERNAL RECORD (OUTPATIENT)
Dept: HEALTH INFORMATION MANAGEMENT | Facility: OTHER | Age: 79
End: 2024-08-06

## 2024-08-06 ENCOUNTER — TELEPHONE (OUTPATIENT)
Dept: CARDIOLOGY | Age: 79
End: 2024-08-06

## 2024-08-06 RX ORDER — POLYETHYLENE GLYCOL 3350, SODIUM CHLORIDE, SODIUM BICARBONATE, POTASSIUM CHLORIDE 420; 11.2; 5.72; 1.48 G/4L; G/4L; G/4L; G/4L
4 POWDER, FOR SOLUTION ORAL ONCE
Qty: 4000 ML | Refills: 0 | Status: SHIPPED | OUTPATIENT
Start: 2024-08-06 | End: 2024-08-06

## 2024-08-06 NOTE — TELEPHONE ENCOUNTER
Left message with Yola (wife) for patient to call back to schedule Colonoscopy     Please transfer call to GI surgery scheduling

## 2024-08-06 NOTE — TELEPHONE ENCOUNTER
GI     Please call the patient to schedule for colonoscopy    See MD orders below    Re route to GI RNs after scheduling    Thanks

## 2024-08-06 NOTE — TELEPHONE ENCOUNTER
Noted and appreciated.  May schedule a colonoscopy for a positive Cologuard test following a split dose TriLyte preparation and monitored anesthesia care.  Hold clopidogrel for 5 days preceding the procedure.  Continue aspirin.

## 2024-08-06 NOTE — TELEPHONE ENCOUNTER
Dr Morales    Received clearance to hold plavix for 5 days from Dr KAMALA Villar.    Thank you      Letter sent for scanning

## 2024-08-07 NOTE — TELEPHONE ENCOUNTER
Scheduled for:  Colonoscopy 62624    Provider Name:  Dr Block    Date:  1/29/2025    Location:    [facility]    Sedation:  MAC    Time:  730 am (Patient made aware EM will call the day before with an arrival time)    Prep:  Trylite    Meds/Allergies Reconciled?:  Physician reviewed     Diagnosis with codes:    Positive colorectal cancer screening using Cologuard test [R19.5]    Was patient informed to call insurance with codes (Y/N):  Yes, I confirmed [employer] [place] insurance with the patient.     Referral sent?:  N/A    EM or [facility] notified?:  I sent an electronic request to Endo Scheduling and received a confirmation today.      Medication Orders:  This patient verbally confirmed that he is not taking:   Iron,  BP meds, and is diabetic   Not latex allergy, Not PCN allergy and does not have a pacemaker     Misc Orders:    Hold Plavix 5 days prior to procedure (awaiting Cardiology approval - GI Nurse will call to confirm length of hold)   Hold Jardiance 4 days prior to procedure     Further instructions given by staff:   I discussed the prep instructions with the patient which ** verbally understood and is aware that I will mail the instructions today.

## 2024-08-09 RX ORDER — CLOPIDOGREL BISULFATE 75 MG/1
75 TABLET ORAL DAILY
Qty: 90 TABLET | Refills: 0 | Status: SHIPPED | OUTPATIENT
Start: 2024-08-09 | End: 2024-09-26

## 2024-08-12 ENCOUNTER — TELEPHONE (OUTPATIENT)
Dept: CARDIOLOGY | Age: 79
End: 2024-08-12

## 2024-09-26 ENCOUNTER — APPOINTMENT (OUTPATIENT)
Dept: CARDIOLOGY | Age: 79
End: 2024-09-26

## 2024-09-26 VITALS
DIASTOLIC BLOOD PRESSURE: 72 MMHG | HEIGHT: 65 IN | HEART RATE: 71 BPM | OXYGEN SATURATION: 97 % | SYSTOLIC BLOOD PRESSURE: 120 MMHG | WEIGHT: 128.53 LBS | BODY MASS INDEX: 21.41 KG/M2

## 2024-09-26 DIAGNOSIS — Z95.2 S/P TAVR (TRANSCATHETER AORTIC VALVE REPLACEMENT): Primary | ICD-10-CM

## 2024-09-26 DIAGNOSIS — I42.9 CARDIOMYOPATHY, UNSPECIFIED TYPE  (CMD): ICD-10-CM

## 2024-09-26 DIAGNOSIS — E78.5 DYSLIPIDEMIA: ICD-10-CM

## 2024-09-26 DIAGNOSIS — R42 DIZZINESS: ICD-10-CM

## 2024-09-26 DIAGNOSIS — I25.10 CORONARY ARTERY DISEASE INVOLVING NATIVE CORONARY ARTERY OF NATIVE HEART WITHOUT ANGINA PECTORIS: ICD-10-CM

## 2024-09-26 DIAGNOSIS — I50.20 HFREF (HEART FAILURE WITH REDUCED EJECTION FRACTION)  (CMD): ICD-10-CM

## 2024-09-26 DIAGNOSIS — I10 HYPERTENSION, UNSPECIFIED TYPE: ICD-10-CM

## 2024-09-26 PROBLEM — I27.20 PULMONARY HTN  (CMD): Status: RESOLVED | Noted: 2024-05-29 | Resolved: 2024-09-26

## 2024-09-26 PROBLEM — I35.0 SEVERE AORTIC STENOSIS: Status: RESOLVED | Noted: 2023-05-26 | Resolved: 2024-09-26

## 2024-09-26 PROBLEM — J90 PLEURAL EFFUSION: Status: RESOLVED | Noted: 2024-05-29 | Resolved: 2024-09-26

## 2024-09-26 PROBLEM — R93.1 ABNORMAL ECHOCARDIOGRAM: Status: RESOLVED | Noted: 2024-04-26 | Resolved: 2024-09-26

## 2024-09-26 PROBLEM — Z09 HOSPITAL DISCHARGE FOLLOW-UP: Status: RESOLVED | Noted: 2024-05-29 | Resolved: 2024-09-26

## 2024-09-26 RX ORDER — LISINOPRIL 2.5 MG/1
2.5 TABLET ORAL DAILY
Qty: 30 TABLET | Refills: 1 | Status: SHIPPED | OUTPATIENT
Start: 2024-09-26

## 2024-09-26 RX ORDER — AMLODIPINE BESYLATE 5 MG/1
5 TABLET ORAL DAILY
COMMUNITY
End: 2024-09-26 | Stop reason: ALTCHOICE

## 2024-09-26 RX ORDER — LISINOPRIL 2.5 MG/1
2.5 TABLET ORAL DAILY
Qty: 30 TABLET | Refills: 1 | Status: SHIPPED | OUTPATIENT
Start: 2024-09-26 | End: 2024-09-26 | Stop reason: SDUPTHER

## 2024-09-26 SDOH — HEALTH STABILITY: PHYSICAL HEALTH: ON AVERAGE, HOW MANY MINUTES DO YOU ENGAGE IN EXERCISE AT THIS LEVEL?: 0 MIN

## 2024-09-26 ASSESSMENT — PATIENT HEALTH QUESTIONNAIRE - PHQ9
2. FEELING DOWN, DEPRESSED OR HOPELESS: NOT AT ALL
SUM OF ALL RESPONSES TO PHQ9 QUESTIONS 1 AND 2: 0
CLINICAL INTERPRETATION OF PHQ2 SCORE: NO FURTHER SCREENING NEEDED
SUM OF ALL RESPONSES TO PHQ9 QUESTIONS 1 AND 2: 0
1. LITTLE INTEREST OR PLEASURE IN DOING THINGS: NOT AT ALL

## 2024-10-03 ENCOUNTER — APPOINTMENT (OUTPATIENT)
Dept: CARDIOLOGY | Age: 79
End: 2024-10-03
Attending: SPECIALIST

## 2024-10-03 ENCOUNTER — ANCILLARY PROCEDURE (OUTPATIENT)
Dept: CARDIOLOGY | Age: 79
End: 2024-10-03
Attending: SPECIALIST

## 2024-10-03 DIAGNOSIS — Z95.2 S/P TAVR (TRANSCATHETER AORTIC VALVE REPLACEMENT): ICD-10-CM

## 2024-10-03 DIAGNOSIS — I42.9 CARDIOMYOPATHY, UNSPECIFIED TYPE  (CMD): ICD-10-CM

## 2024-10-03 DIAGNOSIS — R42 DIZZINESS: ICD-10-CM

## 2024-10-03 DIAGNOSIS — I50.20 HFREF (HEART FAILURE WITH REDUCED EJECTION FRACTION)  (CMD): ICD-10-CM

## 2024-10-03 PROCEDURE — 93356 MYOCRD STRAIN IMG SPCKL TRCK: CPT | Performed by: SPECIALIST

## 2024-10-03 PROCEDURE — 93308 TTE F-UP OR LMTD: CPT | Performed by: SPECIALIST

## 2024-10-03 PROCEDURE — 93325 DOPPLER ECHO COLOR FLOW MAPG: CPT | Performed by: SPECIALIST

## 2024-10-03 PROCEDURE — 93321 DOPPLER ECHO F-UP/LMTD STD: CPT | Performed by: SPECIALIST

## 2024-10-03 PROCEDURE — 76376 3D RENDER W/INTRP POSTPROCES: CPT | Performed by: SPECIALIST

## 2024-10-11 ENCOUNTER — OFFICE VISIT (OUTPATIENT)
Dept: CARDIOLOGY | Age: 79
End: 2024-10-11
Attending: NURSE PRACTITIONER

## 2024-10-11 VITALS
SYSTOLIC BLOOD PRESSURE: 138 MMHG | BODY MASS INDEX: 21.72 KG/M2 | HEART RATE: 61 BPM | DIASTOLIC BLOOD PRESSURE: 66 MMHG | WEIGHT: 130.51 LBS

## 2024-10-11 DIAGNOSIS — I10 HYPERTENSION, UNSPECIFIED TYPE: ICD-10-CM

## 2024-10-11 DIAGNOSIS — Z95.2 S/P TAVR (TRANSCATHETER AORTIC VALVE REPLACEMENT): Primary | ICD-10-CM

## 2024-10-11 DIAGNOSIS — I44.7 LBBB (LEFT BUNDLE BRANCH BLOCK): ICD-10-CM

## 2024-10-11 DIAGNOSIS — I50.23 ACUTE ON CHRONIC HEART FAILURE WITH REDUCED EJECTION FRACTION (HFREF, <= 40%) (CMD): ICD-10-CM

## 2024-10-11 DIAGNOSIS — E78.5 DYSLIPIDEMIA: ICD-10-CM

## 2024-10-11 DIAGNOSIS — I27.20 PULMONARY HTN  (CMD): ICD-10-CM

## 2024-10-11 DIAGNOSIS — I25.10 CORONARY ARTERY DISEASE INVOLVING NATIVE CORONARY ARTERY OF NATIVE HEART WITHOUT ANGINA PECTORIS: ICD-10-CM

## 2024-10-11 DIAGNOSIS — I35.0 SEVERE AORTIC STENOSIS: ICD-10-CM

## 2024-10-11 LAB
ALBUMIN SERPL-MCNC: 2.8 G/DL (ref 3.4–5)
ALBUMIN/GLOB SERPL: 0.6 {RATIO} (ref 1–2.4)
ALP SERPL-CCNC: 65 UNITS/L (ref 45–117)
ALT SERPL-CCNC: 16 UNITS/L
ANION GAP SERPL CALC-SCNC: 8 MMOL/L (ref 7–19)
AST SERPL-CCNC: 16 UNITS/L
BASOPHILS # BLD: 0 K/MCL (ref 0–0.3)
BASOPHILS NFR BLD: 0 %
BILIRUB SERPL-MCNC: 0.4 MG/DL (ref 0.2–1)
BUN SERPL-MCNC: 17 MG/DL (ref 6–20)
BUN/CREAT SERPL: 15 (ref 7–25)
CALCIUM SERPL-MCNC: 8.9 MG/DL (ref 8.4–10.2)
CHLORIDE SERPL-SCNC: 108 MMOL/L (ref 97–110)
CO2 SERPL-SCNC: 28 MMOL/L (ref 21–32)
CREAT SERPL-MCNC: 1.11 MG/DL (ref 0.67–1.17)
DEPRECATED RDW RBC: 42.3 FL (ref 39–50)
EGFRCR SERPLBLD CKD-EPI 2021: 68 ML/MIN/{1.73_M2}
EOSINOPHIL # BLD: 0.2 K/MCL (ref 0–0.5)
EOSINOPHIL NFR BLD: 2 %
ERYTHROCYTE [DISTWIDTH] IN BLOOD: 13.6 % (ref 11–15)
FASTING DURATION TIME PATIENT: ABNORMAL H
GLOBULIN SER-MCNC: 4.7 G/DL (ref 2–4)
GLUCOSE SERPL-MCNC: 93 MG/DL (ref 70–99)
HCT VFR BLD CALC: 44.2 % (ref 39–51)
HGB BLD-MCNC: 13.8 G/DL (ref 13–17)
IMM GRANULOCYTES # BLD AUTO: 0.1 K/MCL (ref 0–0.2)
IMM GRANULOCYTES # BLD: 1 %
LYMPHOCYTES # BLD: 1.5 K/MCL (ref 1–4)
LYMPHOCYTES NFR BLD: 13 %
MCH RBC QN AUTO: 26.4 PG (ref 26–34)
MCHC RBC AUTO-ENTMCNC: 31.2 G/DL (ref 32–36.5)
MCV RBC AUTO: 84.5 FL (ref 78–100)
MONOCYTES # BLD: 0.5 K/MCL (ref 0.3–0.9)
MONOCYTES NFR BLD: 5 %
NEUTROPHILS # BLD: 9.4 K/MCL (ref 1.8–7.7)
NEUTROPHILS NFR BLD: 79 %
NRBC BLD MANUAL-RTO: 0 /100 WBC
NT-PROBNP SERPL-MCNC: 739 PG/ML
PLATELET # BLD AUTO: 176 K/MCL (ref 140–450)
POTASSIUM SERPL-SCNC: 4.2 MMOL/L (ref 3.4–5.1)
PROT SERPL-MCNC: 7.5 G/DL (ref 6.4–8.2)
RBC # BLD: 5.23 MIL/MCL (ref 4.5–5.9)
SODIUM SERPL-SCNC: 140 MMOL/L (ref 135–145)
WBC # BLD: 11.7 K/MCL (ref 4.2–11)

## 2024-10-11 PROCEDURE — 85025 COMPLETE CBC W/AUTO DIFF WBC: CPT | Performed by: NURSE PRACTITIONER

## 2024-10-11 PROCEDURE — 80053 COMPREHEN METABOLIC PANEL: CPT | Performed by: NURSE PRACTITIONER

## 2024-10-11 PROCEDURE — 83880 ASSAY OF NATRIURETIC PEPTIDE: CPT | Performed by: NURSE PRACTITIONER

## 2024-10-11 PROCEDURE — 99211 OFF/OP EST MAY X REQ PHY/QHP: CPT

## 2024-10-11 RX ORDER — UBIDECARENONE 200 MG
200 CAPSULE ORAL DAILY
COMMUNITY

## 2024-10-11 RX ORDER — LORATADINE 10 MG/1
10 TABLET ORAL DAILY
COMMUNITY
Start: 2024-10-07

## 2024-10-11 RX ORDER — FLUTICASONE PROPIONATE 50 MCG
1 SPRAY, SUSPENSION (ML) NASAL DAILY
COMMUNITY
Start: 2024-09-25

## 2024-10-11 RX ORDER — MECLIZINE HYDROCHLORIDE 25 MG/1
25 TABLET ORAL EVERY 8 HOURS PRN
COMMUNITY
Start: 2024-09-25

## 2024-10-11 RX ORDER — LATANOPROST 50 UG/ML
1 SOLUTION/ DROPS OPHTHALMIC NIGHTLY
COMMUNITY
Start: 2024-05-30

## 2024-10-11 RX ORDER — SPIRONOLACTONE 25 MG/1
12.5 TABLET ORAL DAILY
Qty: 45 TABLET | Refills: 3 | Status: SHIPPED | OUTPATIENT
Start: 2024-10-11

## 2024-11-07 ENCOUNTER — APPOINTMENT (OUTPATIENT)
Dept: CARDIOLOGY | Age: 79
End: 2024-11-07

## 2024-11-07 VITALS
HEIGHT: 65 IN | HEART RATE: 66 BPM | WEIGHT: 128.97 LBS | DIASTOLIC BLOOD PRESSURE: 81 MMHG | SYSTOLIC BLOOD PRESSURE: 144 MMHG | OXYGEN SATURATION: 99 % | BODY MASS INDEX: 21.49 KG/M2

## 2024-11-07 DIAGNOSIS — E78.5 DYSLIPIDEMIA: ICD-10-CM

## 2024-11-07 DIAGNOSIS — Z95.2 S/P TAVR (TRANSCATHETER AORTIC VALVE REPLACEMENT): ICD-10-CM

## 2024-11-07 DIAGNOSIS — I50.20 HFREF (HEART FAILURE WITH REDUCED EJECTION FRACTION)  (CMD): Primary | ICD-10-CM

## 2024-11-07 DIAGNOSIS — I10 HYPERTENSION, UNSPECIFIED TYPE: ICD-10-CM

## 2024-11-07 DIAGNOSIS — I25.10 CORONARY ARTERY DISEASE INVOLVING NATIVE CORONARY ARTERY OF NATIVE HEART WITHOUT ANGINA PECTORIS: ICD-10-CM

## 2024-11-07 DIAGNOSIS — I44.7 LBBB (LEFT BUNDLE BRANCH BLOCK): ICD-10-CM

## 2024-11-07 RX ORDER — LISINOPRIL 5 MG/1
5 TABLET ORAL AT BEDTIME
Qty: 90 TABLET | Refills: 1 | Status: SHIPPED | OUTPATIENT
Start: 2024-11-07

## 2024-11-07 ASSESSMENT — PATIENT HEALTH QUESTIONNAIRE - PHQ9
SUM OF ALL RESPONSES TO PHQ9 QUESTIONS 1 AND 2: 0
2. FEELING DOWN, DEPRESSED OR HOPELESS: NOT AT ALL
1. LITTLE INTEREST OR PLEASURE IN DOING THINGS: NOT AT ALL
SUM OF ALL RESPONSES TO PHQ9 QUESTIONS 1 AND 2: 0
CLINICAL INTERPRETATION OF PHQ2 SCORE: NO FURTHER SCREENING NEEDED

## 2024-11-16 ENCOUNTER — EXTERNAL LAB (OUTPATIENT)
Dept: HEALTH INFORMATION MANAGEMENT | Facility: OTHER | Age: 79
End: 2024-11-16

## 2024-11-16 LAB
BUN SERPL-MCNC: 16 MG/DL (ref 9–23)
CALCIUM SERPL-MCNC: 9.3 MG/DL (ref 8.7–10.4)
CHLORIDE SERPL-SCNC: 108 MMOL/L (ref 98–110)
CO2 SERPL-SCNC: 25 MMOL/L (ref 20–31)
CREAT SERPL-MCNC: 1.17 MG/DL (ref 0.7–1.3)
GFR SERPLBLD SCHWARTZ-ARVRAT: 64 ML/MIN/{1.73_M2}
GLUCOSE SERPL-MCNC: 96 MG/DL (ref 74–106)
LENGTH OF FAST TIME PATIENT: NORMAL H
POTASSIUM SERPL-SCNC: 4.8 MMOL/L (ref 3.5–5.1)
SODIUM SERPL-SCNC: 140 MMOL/L (ref 136–145)

## 2024-11-25 ENCOUNTER — TELEPHONE (OUTPATIENT)
Dept: CARDIOLOGY | Age: 79
End: 2024-11-25

## 2024-12-11 ENCOUNTER — TELEPHONE (OUTPATIENT)
Dept: CARDIOLOGY | Age: 79
End: 2024-12-11

## 2024-12-11 RX ORDER — AMOXICILLIN 500 MG/1
TABLET, FILM COATED ORAL
Qty: 4 TABLET | Refills: 0 | Status: SHIPPED | OUTPATIENT
Start: 2024-12-11

## 2025-01-23 ENCOUNTER — TELEPHONE (OUTPATIENT)
Facility: CLINIC | Age: 80
End: 2025-01-23

## 2025-01-23 NOTE — TELEPHONE ENCOUNTER
Patient contacted,date of birth verified and MyChart prep instructions from 08/07/2024 reviewed with patient.  Patient voiced understanding.  No further questions at this time.

## 2025-01-27 ENCOUNTER — TELEPHONE (OUTPATIENT)
Facility: CLINIC | Age: 80
End: 2025-01-27

## 2025-01-27 DIAGNOSIS — R19.5 POSITIVE COLORECTAL CANCER SCREENING USING COLOGUARD TEST: Primary | ICD-10-CM

## 2025-01-27 NOTE — TELEPHONE ENCOUNTER
Patient calling to reschedule 1/29/2025 colonoscopy - is there availability in the summer?  Please call.  Thank you.

## 2025-01-27 NOTE — TELEPHONE ENCOUNTER
Rescheduled for:  Colonoscopy 16141     Provider Name:  Dr Block     Date: FROM 1/29/2025 TO 7/2/2025     Location:    [facility]     Sedation:  MAC     Time:  730 am (Patient made aware EM will call the day before with an arrival time)     Prep:  Trylite     Meds/Allergies Reconciled?:  Physician reviewed      Diagnosis with codes:    Positive colorectal cancer screening using Cologuard test [R19.5]     Was patient informed to call insurance with codes (Y/N):  Yes, I confirmed [employer] [place] insurance with the patient.      Referral sent?:  N/A     EM or [facility] notified?:  I sent an electronic request to Endo Scheduling and received a confirmation today.      Medication Orders:  This patient verbally confirmed that he is not taking:               Iron,  BP meds, and is diabetic               Not latex allergy, Not PCN allergy and does not have a pacemaker     Misc Orders:    Hold Plavix 5 days prior to procedure (awaiting Cardiology approval - GI Nurse will call to confirm length of hold)   Hold Jardiance 4 days prior to procedure     Further instructions given by staff:   I discussed the prep instructions with the patient which ** verbally understood and is aware that I will mail the instructions today.

## 2025-01-27 NOTE — TELEPHONE ENCOUNTER
PPD-    Patient colonoscopy 31396 is now scheduled on 7/2/2025 with Dr. Block at [facility], dx codes; Positive colorectal cancer screening using Cologuard test [R19.5]      Thanks!

## 2025-02-13 ENCOUNTER — APPOINTMENT (OUTPATIENT)
Dept: CARDIOLOGY | Age: 80
End: 2025-02-13

## 2025-02-13 ENCOUNTER — LAB SERVICES (OUTPATIENT)
Dept: LAB | Age: 80
End: 2025-02-13

## 2025-02-13 VITALS
BODY MASS INDEX: 21.87 KG/M2 | HEART RATE: 66 BPM | HEIGHT: 65 IN | OXYGEN SATURATION: 99 % | DIASTOLIC BLOOD PRESSURE: 84 MMHG | SYSTOLIC BLOOD PRESSURE: 130 MMHG | WEIGHT: 131.28 LBS

## 2025-02-13 DIAGNOSIS — I10 HYPERTENSION, UNSPECIFIED TYPE: ICD-10-CM

## 2025-02-13 DIAGNOSIS — I50.20 HFREF (HEART FAILURE WITH REDUCED EJECTION FRACTION)  (CMD): ICD-10-CM

## 2025-02-13 DIAGNOSIS — E78.5 DYSLIPIDEMIA: ICD-10-CM

## 2025-02-13 DIAGNOSIS — I44.7 LBBB (LEFT BUNDLE BRANCH BLOCK): ICD-10-CM

## 2025-02-13 DIAGNOSIS — Z95.2 S/P TAVR (TRANSCATHETER AORTIC VALVE REPLACEMENT): Primary | ICD-10-CM

## 2025-02-13 DIAGNOSIS — I25.10 CORONARY ARTERY DISEASE INVOLVING NATIVE CORONARY ARTERY OF NATIVE HEART WITHOUT ANGINA PECTORIS: ICD-10-CM

## 2025-02-13 LAB — NT-PROBNP SERPL-MCNC: 890 PG/ML

## 2025-02-13 PROCEDURE — 83880 ASSAY OF NATRIURETIC PEPTIDE: CPT | Performed by: INTERNAL MEDICINE

## 2025-02-13 PROCEDURE — 36415 COLL VENOUS BLD VENIPUNCTURE: CPT | Performed by: SPECIALIST

## 2025-02-13 RX ORDER — METOPROLOL SUCCINATE 25 MG/1
25 TABLET, EXTENDED RELEASE ORAL AT BEDTIME
Qty: 90 TABLET | Refills: 3 | Status: SHIPPED | OUTPATIENT
Start: 2025-02-13

## 2025-02-13 SDOH — HEALTH STABILITY: PHYSICAL HEALTH: ON AVERAGE, HOW MANY MINUTES DO YOU ENGAGE IN EXERCISE AT THIS LEVEL?: 0 MIN

## 2025-02-13 SDOH — HEALTH STABILITY: PHYSICAL HEALTH: ON AVERAGE, HOW MANY DAYS PER WEEK DO YOU ENGAGE IN MODERATE TO STRENUOUS EXERCISE (LIKE A BRISK WALK)?: 0 DAYS

## 2025-02-13 ASSESSMENT — PATIENT HEALTH QUESTIONNAIRE - PHQ9
CLINICAL INTERPRETATION OF PHQ2 SCORE: NO FURTHER SCREENING NEEDED
2. FEELING DOWN, DEPRESSED OR HOPELESS: NOT AT ALL
SUM OF ALL RESPONSES TO PHQ9 QUESTIONS 1 AND 2: 0
SUM OF ALL RESPONSES TO PHQ9 QUESTIONS 1 AND 2: 0
1. LITTLE INTEREST OR PLEASURE IN DOING THINGS: NOT AT ALL

## 2025-03-20 ENCOUNTER — TELEPHONE (OUTPATIENT)
Dept: CARDIOLOGY | Age: 80
End: 2025-03-20

## 2025-03-24 ENCOUNTER — MOBILE (OUTPATIENT)
Dept: CARDIOLOGY | Age: 80
End: 2025-03-24

## 2025-03-24 RX ORDER — AMLODIPINE BESYLATE 5 MG/1
5 TABLET ORAL DAILY
Qty: 90 TABLET | Refills: 3 | Status: CANCELLED
Start: 2025-03-24

## 2025-03-24 RX ORDER — AMLODIPINE BESYLATE 5 MG/1
5 TABLET ORAL DAILY
Qty: 90 TABLET | Refills: 3 | Status: SHIPPED | OUTPATIENT
Start: 2025-03-24

## 2025-04-16 ENCOUNTER — TELEPHONE (OUTPATIENT)
Dept: CARDIOLOGY | Age: 80
End: 2025-04-16

## 2025-04-16 RX ORDER — AMLODIPINE BESYLATE 5 MG/1
5 TABLET ORAL DAILY
Qty: 90 TABLET | Refills: 3 | Status: SHIPPED | OUTPATIENT
Start: 2025-04-16

## 2025-04-18 RX ORDER — ATORVASTATIN CALCIUM 10 MG/1
10 TABLET, FILM COATED ORAL DAILY
Qty: 90 TABLET | Refills: 1 | Status: SHIPPED | OUTPATIENT
Start: 2025-04-18

## 2025-04-24 ENCOUNTER — HOSPITAL ENCOUNTER (OUTPATIENT)
Dept: CARDIOLOGY | Age: 80
Discharge: HOME | End: 2025-04-24
Attending: NURSE PRACTITIONER

## 2025-04-24 ENCOUNTER — OFFICE VISIT (OUTPATIENT)
Dept: CARDIOLOGY | Age: 80
End: 2025-04-24
Attending: INTERNAL MEDICINE

## 2025-04-24 VITALS
DIASTOLIC BLOOD PRESSURE: 62 MMHG | BODY MASS INDEX: 21.9 KG/M2 | WEIGHT: 131.61 LBS | SYSTOLIC BLOOD PRESSURE: 120 MMHG | HEART RATE: 69 BPM | RESPIRATION RATE: 16 BRPM

## 2025-04-24 DIAGNOSIS — I35.0 NONRHEUMATIC AORTIC VALVE STENOSIS: ICD-10-CM

## 2025-04-24 DIAGNOSIS — Z95.2 S/P TAVR (TRANSCATHETER AORTIC VALVE REPLACEMENT): Primary | ICD-10-CM

## 2025-04-24 DIAGNOSIS — E78.5 DYSLIPIDEMIA: ICD-10-CM

## 2025-04-24 DIAGNOSIS — I10 HYPERTENSION, UNSPECIFIED TYPE: ICD-10-CM

## 2025-04-24 DIAGNOSIS — I50.20 HFREF (HEART FAILURE WITH REDUCED EJECTION FRACTION)  (CMD): ICD-10-CM

## 2025-04-24 LAB
ALBUMIN SERPL-MCNC: 2.9 G/DL (ref 3.4–5)
ALBUMIN/GLOB SERPL: 0.7 {RATIO} (ref 1–2.4)
ALP SERPL-CCNC: 58 UNITS/L (ref 45–117)
ALT SERPL-CCNC: 19 UNITS/L
ANION GAP SERPL CALC-SCNC: 3 MMOL/L (ref 7–19)
AST SERPL-CCNC: 17 UNITS/L
ATRIAL RATE (BPM): 69
BASOPHILS # BLD: 0 K/MCL (ref 0–0.3)
BASOPHILS NFR BLD: 0 %
BILIRUB SERPL-MCNC: 0.5 MG/DL (ref 0.2–1)
BUN SERPL-MCNC: 16 MG/DL (ref 6–20)
BUN/CREAT SERPL: 16 (ref 7–25)
CALCIUM SERPL-MCNC: 8.9 MG/DL (ref 8.4–10.2)
CHLORIDE SERPL-SCNC: 109 MMOL/L (ref 97–110)
CO2 SERPL-SCNC: 28 MMOL/L (ref 21–32)
CREAT SERPL-MCNC: 0.99 MG/DL (ref 0.67–1.17)
DEPRECATED RDW RBC: 42.4 FL (ref 39–50)
EGFRCR SERPLBLD CKD-EPI 2021: 77 ML/MIN/{1.73_M2}
EOSINOPHIL # BLD: 0.1 K/MCL (ref 0–0.5)
EOSINOPHIL NFR BLD: 1 %
ERYTHROCYTE [DISTWIDTH] IN BLOOD: 13.6 % (ref 11–15)
FASTING DURATION TIME PATIENT: ABNORMAL H
GLOBULIN SER-MCNC: 4.3 G/DL (ref 2–4)
GLUCOSE SERPL-MCNC: 108 MG/DL (ref 70–99)
HCT VFR BLD CALC: 45 % (ref 39–51)
HGB BLD-MCNC: 14 G/DL (ref 13–17)
IMM GRANULOCYTES # BLD AUTO: 0 K/MCL (ref 0–0.2)
IMM GRANULOCYTES # BLD: 0 %
LYMPHOCYTES # BLD: 2 K/MCL (ref 1–4)
LYMPHOCYTES NFR BLD: 22 %
MCH RBC QN AUTO: 26.6 PG (ref 26–34)
MCHC RBC AUTO-ENTMCNC: 31.1 G/DL (ref 32–36.5)
MCV RBC AUTO: 85.4 FL (ref 78–100)
MONOCYTES # BLD: 0.5 K/MCL (ref 0.3–0.9)
MONOCYTES NFR BLD: 5 %
NEUTROPHILS # BLD: 6.3 K/MCL (ref 1.8–7.7)
NEUTROPHILS NFR BLD: 72 %
NRBC BLD MANUAL-RTO: 0 /100 WBC
NT-PROBNP SERPL-MCNC: 152 PG/ML
P AXIS (DEGREES): 39
PLATELET # BLD AUTO: 200 K/MCL (ref 140–450)
POTASSIUM SERPL-SCNC: 4.1 MMOL/L (ref 3.4–5.1)
PR-INTERVAL (MSEC): 152
PROT SERPL-MCNC: 7.2 G/DL (ref 6.4–8.2)
QRS-INTERVAL (MSEC): 166
QT-INTERVAL (MSEC): 488
QTC: 523
R AXIS (DEGREES): 22
RBC # BLD: 5.27 MIL/MCL (ref 4.5–5.9)
REPORT TEXT: NORMAL
SODIUM SERPL-SCNC: 136 MMOL/L (ref 135–145)
T AXIS (DEGREES): -157
VENTRICULAR RATE EKG/MIN (BPM): 69
WBC # BLD: 8.9 K/MCL (ref 4.2–11)

## 2025-04-24 PROCEDURE — 80053 COMPREHEN METABOLIC PANEL: CPT | Performed by: NURSE PRACTITIONER

## 2025-04-24 PROCEDURE — 83880 ASSAY OF NATRIURETIC PEPTIDE: CPT | Performed by: NURSE PRACTITIONER

## 2025-04-24 PROCEDURE — 99212 OFFICE O/P EST SF 10 MIN: CPT

## 2025-04-24 PROCEDURE — 93306 TTE W/DOPPLER COMPLETE: CPT

## 2025-04-24 PROCEDURE — 93306 TTE W/DOPPLER COMPLETE: CPT | Performed by: INTERNAL MEDICINE

## 2025-04-24 PROCEDURE — 85025 COMPLETE CBC W/AUTO DIFF WBC: CPT | Performed by: NURSE PRACTITIONER

## 2025-04-24 PROCEDURE — 93005 ELECTROCARDIOGRAM TRACING: CPT | Performed by: NURSE PRACTITIONER

## 2025-04-24 RX ORDER — LISINOPRIL 5 MG/1
5 TABLET ORAL 2 TIMES DAILY
Qty: 60 TABLET | Refills: 1 | Status: SHIPPED | OUTPATIENT
Start: 2025-04-24

## 2025-04-26 ENCOUNTER — RESULTS FOLLOW-UP (OUTPATIENT)
Dept: CARDIOLOGY | Age: 80
End: 2025-04-26

## 2025-04-28 ENCOUNTER — TELEPHONE (OUTPATIENT)
Dept: CARDIOLOGY | Age: 80
End: 2025-04-28

## 2025-05-01 ENCOUNTER — APPOINTMENT (OUTPATIENT)
Dept: CARDIOLOGY | Age: 80
End: 2025-05-01

## 2025-05-19 ENCOUNTER — APPOINTMENT (OUTPATIENT)
Dept: CARDIOLOGY | Age: 80
End: 2025-05-19

## 2025-05-19 VITALS
OXYGEN SATURATION: 97 % | BODY MASS INDEX: 22.09 KG/M2 | SYSTOLIC BLOOD PRESSURE: 123 MMHG | HEART RATE: 69 BPM | HEIGHT: 65 IN | DIASTOLIC BLOOD PRESSURE: 76 MMHG | WEIGHT: 132.61 LBS

## 2025-05-19 DIAGNOSIS — I10 HYPERTENSION, UNSPECIFIED TYPE: ICD-10-CM

## 2025-05-19 DIAGNOSIS — E78.5 DYSLIPIDEMIA: ICD-10-CM

## 2025-05-19 DIAGNOSIS — I25.10 CORONARY ARTERY DISEASE INVOLVING NATIVE CORONARY ARTERY OF NATIVE HEART WITHOUT ANGINA PECTORIS: ICD-10-CM

## 2025-05-19 DIAGNOSIS — Z95.2 S/P TAVR (TRANSCATHETER AORTIC VALVE REPLACEMENT): Primary | ICD-10-CM

## 2025-05-19 DIAGNOSIS — I50.20 HFREF (HEART FAILURE WITH REDUCED EJECTION FRACTION)  (CMD): ICD-10-CM

## 2025-05-19 SDOH — HEALTH STABILITY: PHYSICAL HEALTH: ON AVERAGE, HOW MANY DAYS PER WEEK DO YOU ENGAGE IN MODERATE TO STRENUOUS EXERCISE (LIKE A BRISK WALK)?: 0 DAYS

## 2025-05-19 SDOH — HEALTH STABILITY: PHYSICAL HEALTH: ON AVERAGE, HOW MANY MINUTES DO YOU ENGAGE IN EXERCISE AT THIS LEVEL?: 0 MIN

## 2025-05-19 ASSESSMENT — PATIENT HEALTH QUESTIONNAIRE - PHQ9
1. LITTLE INTEREST OR PLEASURE IN DOING THINGS: NOT AT ALL
CLINICAL INTERPRETATION OF PHQ2 SCORE: NO FURTHER SCREENING NEEDED
CLINICAL INTERPRETATION OF PHQ2 SCORE: NO FURTHER SCREENING NEEDED
1. LITTLE INTEREST OR PLEASURE IN DOING THINGS: NOT AT ALL
2. FEELING DOWN, DEPRESSED OR HOPELESS: NOT AT ALL
SUM OF ALL RESPONSES TO PHQ9 QUESTIONS 1 AND 2: 0
2. FEELING DOWN, DEPRESSED OR HOPELESS: NOT AT ALL
SUM OF ALL RESPONSES TO PHQ9 QUESTIONS 1 AND 2: 0

## 2025-05-22 ENCOUNTER — APPOINTMENT (OUTPATIENT)
Dept: CARDIOLOGY | Age: 80
End: 2025-05-22
Attending: NURSE PRACTITIONER

## 2025-06-26 DIAGNOSIS — I35.0 NONRHEUMATIC AORTIC VALVE STENOSIS: ICD-10-CM

## 2025-06-26 DIAGNOSIS — I10 HYPERTENSION, UNSPECIFIED TYPE: ICD-10-CM

## 2025-06-26 DIAGNOSIS — I50.20 HFREF (HEART FAILURE WITH REDUCED EJECTION FRACTION)  (CMD): ICD-10-CM

## 2025-06-26 DIAGNOSIS — Z95.2 S/P TAVR (TRANSCATHETER AORTIC VALVE REPLACEMENT): ICD-10-CM

## 2025-06-26 DIAGNOSIS — E78.5 DYSLIPIDEMIA: ICD-10-CM

## 2025-07-01 RX ORDER — LISINOPRIL 5 MG/1
TABLET ORAL
Qty: 60 TABLET | Refills: 0 | Status: SHIPPED | OUTPATIENT
Start: 2025-07-01

## 2025-07-15 ENCOUNTER — TELEPHONE (OUTPATIENT)
Facility: CLINIC | Age: 80
End: 2025-07-15

## 2025-07-15 NOTE — TELEPHONE ENCOUNTER
Per patient he received a request to move his colonoscopy to 10/23 and states he cannot and wants to keep his date 10/29. Thank you

## 2025-07-30 DIAGNOSIS — I10 HYPERTENSION, UNSPECIFIED TYPE: ICD-10-CM

## 2025-07-30 DIAGNOSIS — Z95.2 S/P TAVR (TRANSCATHETER AORTIC VALVE REPLACEMENT): ICD-10-CM

## 2025-07-30 DIAGNOSIS — E78.5 DYSLIPIDEMIA: ICD-10-CM

## 2025-07-30 DIAGNOSIS — I35.0 NONRHEUMATIC AORTIC VALVE STENOSIS: ICD-10-CM

## 2025-07-30 DIAGNOSIS — I50.20 HFREF (HEART FAILURE WITH REDUCED EJECTION FRACTION)  (CMD): ICD-10-CM

## 2025-07-30 RX ORDER — LISINOPRIL 5 MG/1
TABLET ORAL
Qty: 60 TABLET | Refills: 0 | Status: SHIPPED | OUTPATIENT
Start: 2025-07-30

## 2025-08-14 ENCOUNTER — APPOINTMENT (OUTPATIENT)
Dept: CARDIOLOGY | Age: 80
End: 2025-08-14

## 2025-08-19 ENCOUNTER — TELEPHONE (OUTPATIENT)
Dept: CARDIOLOGY | Age: 80
End: 2025-08-19

## 2025-08-28 DIAGNOSIS — Z95.2 S/P TAVR (TRANSCATHETER AORTIC VALVE REPLACEMENT): ICD-10-CM

## 2025-08-28 DIAGNOSIS — I50.20 HFREF (HEART FAILURE WITH REDUCED EJECTION FRACTION)  (CMD): ICD-10-CM

## 2025-08-28 DIAGNOSIS — E78.5 DYSLIPIDEMIA: ICD-10-CM

## 2025-08-28 DIAGNOSIS — I35.0 NONRHEUMATIC AORTIC VALVE STENOSIS: ICD-10-CM

## 2025-08-28 DIAGNOSIS — I10 HYPERTENSION, UNSPECIFIED TYPE: ICD-10-CM

## 2025-08-28 RX ORDER — LISINOPRIL 5 MG/1
TABLET ORAL
Qty: 60 TABLET | Refills: 0 | Status: SHIPPED | OUTPATIENT
Start: 2025-08-28

## 2026-05-21 ENCOUNTER — APPOINTMENT (OUTPATIENT)
Dept: CARDIOLOGY | Age: 81
End: 2026-05-21

## (undated) DEVICE — DRAIN INCS ATR OASIS PLASTIC CHEST THOR TUBE DRY SCT

## (undated) DEVICE — STAPLER SKIN 3.9X6.9MM WIDE 35 CNT FX HEAD RCHT STRL LF

## (undated) DEVICE — Device

## (undated) DEVICE — MANIFOLD ANGIO 3 PORT OFF HNDL FNGR GRIP STYLE MED PRSS ACTL

## (undated) DEVICE — BAG WST 48IN SPK FLTR RLLR CLAMP FEMALE LL TUBE VENT MERIT

## (undated) DEVICE — STOPCOCK IV DARK BLUE .082IN MARQUIS 1050 PSI PLYCRB 3W HPRS

## (undated) DEVICE — DRAPE 2 INCS FILM ANTIMICROBIAL 23X17IN SURG IOBAN STRL

## (undated) DEVICE — GUIDEWIRE 150CM .035IN VASC PTFE XFRM TIP FX CORE LF

## (undated) DEVICE — NEEDLE PROC 18GA 9CM THNWL BSPLT STRL DISP PERC VASC ACC

## (undated) DEVICE — SHIELD RAD RADPAD RAD PRTC STRL FEM ENTRY ANGIO

## (undated) DEVICE — DRAPE 2 INCS FILM ANTIMICROBIAL 33X23IN SURG IOBAN STRL

## (undated) DEVICE — SHEATH 8FR 10CM 2.5CM .035IN INTRO SNAP ON DIL LOCK KINK RST

## (undated) DEVICE — SYRINGE 8ML ROTATE MALE LL ADPR RING GRIP MED INJECT8 CCS

## (undated) DEVICE — GLOVE SURG 7.5 PROTEXIS LF CRM PF SMTH BEAD CUFF STRL

## (undated) DEVICE — SUTURE 7 CCS 18IN SS MONO 4 STRN NABSB M655G

## (undated) DEVICE — BLADE SAW 1MM 30.5MM THK0.6MM STRNM SYS 6

## (undated) DEVICE — VALVE AOR SAPIEN 3 ULTRA CMNDR 23MM DLV SYS STRL LF

## (undated) DEVICE — GLOVE SURG 6.5 PROTEXIS LF CRM PF BEAD CUFF STRL PLISPRN

## (undated) DEVICE — CATHETER 6FR PGTL CRV 110CM WIRE BRAID ROBUST SHAFT EXPO

## (undated) DEVICE — GUIDEWIRE 150CM 3MM RDS J CRV .035IN VASC PTFE FX CORE LF

## (undated) DEVICE — SNARE CAPTIFLEX MICRO-OVL OLY

## (undated) DEVICE — Device: Brand: DEFENDO AIR/WATER/SUCTION AND BIOPSY VALVE

## (undated) DEVICE — CABLE TRNDCR TRANSPAC PVC MALE TO FEMALE CNCT HPRS

## (undated) DEVICE — CATHETER 6FR AL1 CRV 100CM LG INNER LUM RADOPQ SLCT INFNT

## (undated) DEVICE — ADAPTER TBG 2 MALE LL DEHP-FR STRL LF MEDEX 1IN DISP .1ML IV

## (undated) DEVICE — GUIDEWIRE VASCULAR SAVVYWIRE 3.2CM XSMALL 2.9CM PRE-SHAPED 1ST GEN INTEGRATED PRESSURE SENSOR FTAVR SOLUTION

## (undated) DEVICE — DECANTER FLUID DSPNSR BAG BAJ DISP STRL LF ASEPTIC TRNSF

## (undated) DEVICE — CATHETER 6FR 145D PGTL CRV 110CM 2 WIRE BRAID STIFF PROX

## (undated) DEVICE — CATHETER 6FR AR MOD CRV 100CM FULL LGTH WIRE BRAID ROBUST

## (undated) DEVICE — IMMOBILIZER KN UNV 18IN REBOUND NS LF REUSE

## (undated) DEVICE — SET ESHEATH INTRD

## (undated) DEVICE — GOWN SURG XL L4 IMPRV REINFORCE SET IN SLV STRL LF DISP BLUE

## (undated) DEVICE — ENDOSCOPY PACK - LOWER: Brand: MEDLINE INDUSTRIES, INC.

## (undated) DEVICE — SPONGE GAUZE 4X4IN CTN 12 PLY STRL CURITY

## (undated) DEVICE — WIRE MYOWR2 6 .5 CRC CCS1 14X3IN 2 MONO SUT SS STRNM

## (undated) DEVICE — CATHETER 6FR JR4 CRV 100CM LG INNER LUM RADOPQ SLCT INFNT

## (undated) DEVICE — SYRINGE 50ML GRAD N-PYRG DEHP-FR PVC FREE STRL MED LF DISP

## (undated) DEVICE — DEVICE BLWRMSTR ACCUMIST ATCH TBG SET

## (undated) DEVICE — GLOVE SURG 7 BIOGEL M LTX STRAW PF TXTR BEAD CUFF N-PYRG

## (undated) DEVICE — SHEATH 6FR 10CM 2.5CM .035IN INTRO SNAP ON DIL LOCK KINK RST

## (undated) DEVICE — KIT SUT 30IN 2-0 KC-2 BRAID DEKNATEL 5-PAK SILKY 2 PLYDK

## (undated) DEVICE — SHEATH 7FR 10CM 2.5CM .035IN INTRO SNAP ON DIL LOCK KINK RST

## (undated) DEVICE — SNARE OPTMZ PLPCTM TRP

## (undated) DEVICE — CATHETER 6FR FL5 CRV 100CM FULL LGTH WIRE BRAID ROBUST SHAFT

## (undated) DEVICE — GUIDEWIRE STRT 10CM 260CM J CRV TPR .035IN VASC PTFE PERIPH

## (undated) DEVICE — KIT SYRINGE 3ML ABG LL TIP LN DRAW FLTRPRO DRY LIHEP DISP

## (undated) DEVICE — CATHETER 6FR 145D PGTL CRV .038IN 110CM 8 SH OUTER LUM 5 SH

## (undated) DEVICE — TOWEL SURG 26X15IN BLUE TIBURON NABSB DRAPE ADH STRIP STRL

## (undated) DEVICE — GUIDEWIRE OPTOWIRE 3 180CM ORTHO STRGT TIP WO GA CNCT MODERN

## (undated) DEVICE — DRESSING GAUZE 1.5X1.5IN HMST QUIKCLOT

## (undated) DEVICE — CATHETER 6FR PGTL FL4 FR4 CRV 100110CM FULL LGTH WIRE BRAID

## (undated) DEVICE — KIT MICROINTRODUCER 4FR .018IN 40CM 7CM .018IN SFTP MNDRL

## (undated) DEVICE — SYRINGE ANGIO LF MARK 7 ARTERION

## (undated) DEVICE — BLANKET WRM UNDERBODY ADULT 221X91IN 24X48IN BR HGR PLMR 7OZ

## (undated) DEVICE — GUIDEWIRE AMP SPST STRGT .035IN 260CM URO 7CM

## (undated) DEVICE — GLOVE SURG 5.5 PROTEXIS LF CRM PF BEAD CUFF STRL PLISPRN

## (undated) DEVICE — CATHETER PA 110CM 7FR 10MM ARW 1 LUM BLN WDG PRSS TPR TIP

## (undated) DEVICE — BASIN MED GRAD 32OZ BLUE STRL

## (undated) DEVICE — SYRINGE 3ML GRAD N-PYRG DEHP-FR PVC FREE STRL MED LF DISP LL

## (undated) DEVICE — ELECTRODE PT RTN C30- LB 9FT CORD NONIRRITATE NONSENSITIZE

## (undated) DEVICE — SYRINGE 20ML GRAD STRL MED DISP LL

## (undated) DEVICE — TOURNIQUET VASC 7IN 12FR VENACAVA 2 TUBE WIRE

## (undated) DEVICE — INSERT CLAMP STLTH 3 60MM LATIS STRL LF DISP

## (undated) DEVICE — CONTAINER SPEC 4OZ 2.5X2.75IN OR POS INDCTR TMPR EVD LEAK

## (undated) NOTE — LETTER
6/1/2023    [facility]        [address] [place] [id number]            Dear George Gonzalez,      Our records indicate that you are due for an appointment for a Colonoscopy with Jyoti Brady MD. Our doctors are booking out about 3-5 months in advance for procedures. Please call our office to schedule a phone screening appointment to plan for the procedure(s). Your medical well-being is important to us. If your insurance requires a referral, please call your primary care office to request one.       Thank you,      The Physicians and Staff at [facility]

## (undated) NOTE — LETTER
Date & Time: 5/16/2018, 4:44 PM  Patient: Candida Garcia  Encounter Provider(s):    Maggie Martinez MD         This certifies that I, Candida Garcia, a patient at an [facility], am leaving the facility voluntarily and a

## (undated) NOTE — LETTER
[facility] ULTRASOUND  [address] [facility] [address]    Authorization for Imaging Procedure      I hereby authorize Dr. RASMUSSEN , my physician and his/her assistants (if applicable), which may include medical students, residents, and/or fellows, to perform the following procedure and administer such anesthesia as may be determined necessary by my physician: ULTRASOUND GUIDED RIGHT THORACENTESIS on Jian Rasmussen.   2.  I recognize that during the procedure, unforeseen conditions may necessitate additional or different procedures than those listed above. I, therefore, further authorize and request that the above-named physician, assistants, or designees perform such procedures as are, in their judgment, necessary and desirable.    3.  My physician has discussed prior to my procedure the potential benefits, risks and side effects of this procedure; the likelihood of achieving goals; and potential problems that might occur during recuperation. They also discussed reasonable alternatives to the procedure, including risks, benefits, and side effects related to the alternatives and risks related to not receiving this procedure. I have had all my questions answered and I acknowledge that no guarantee has been made as to the result that may be obtained.    4.  Should the need arise during my procedure, which includes change of level of care prior to discharge, I also consent to the administration of blood and/or blood products. Further, I understand that despite careful testing and screening of blood or blood products by collecting agencies, I may still be subject to ill effects as a result of receiving a blood transfusion and/or blood products. The following are some, but not all, of the potential risks that can occur: fever and allergic reactions, hemolytic reactions, transmission of diseases such as Hepatitis, AIDS and Cytomegalovirus (CMV) and fluid overload. In the event that I wish to  have an autologous transfusion of my own blood, or a directed donor transfusion, I will discuss this with my physician.  Check only if Refusing Blood or Blood Products  I understand refusal of blood or blood products as deemed necessary by my physician may have serious consequences to my condition to include possible death. I hereby assume responsibility for my refusal and release the hospital, its personnel, and my physicians from any responsibility for the consequences of my refusal.   [  ] Patient Refuses Blood      5.  I authorize the use of any specimen, organs, tissues, body parts or foreign objects that may be removed from my body during the procedure for diagnosis, research or teaching purposes and their subsequent disposal by hospital authorities. I also authorize the release of specimen test results and/or written reports to my treating physician on the hospital medical staff or other referring or consulting physicians involved in my care, at the discretion of the Pathologist or my treating physician.    6.  I consent to the photographing or videotaping of the procedures to be performed, including appropriate portions of my body for medical, scientific, or educational purposes, provided my identity is not revealed by the pictures or by descriptive texts accompanying them. If the procedure has been photographed/videotaped, the physician will obtain the original picture, image, videotape or CD. The hospital will not be responsible for storage, release or maintenance of the picture, image, tape or CD.   7.  I consent to the presence of a  or observers in the operating room as deemed necessary by my physician or their designees.    8.  I recognize that in the event my procedure results in extended X-Ray/fluoroscopy time, I may develop a skin reaction.    9.  If I have a Do Not Attempt Resuscitation (DNAR) order in place, that status will be suspended while in the operating room, procedural  suite, and during the recovery period unless otherwise explicitly stated by me (or a person authorized to consent on my behalf). The performing physician or my attending physician will determine when the applicable recovery period ends for purposes of reinstating the DNAR order.  10.  I acknowledge that my physician has explained sedation/analgesia administration to me including the risk and benefits I consent to the administration of sedation/analgesia as may be necessary or desirable in the judgment of my physician.      I CERTIFY THAT I HAVE READ AND FULLY UNDERSTAND THE ABOVE CONSENT FOR THE PROCEDURE.   Signature of Patient: _____________________________________________________________  Responsible person in case of minor, unconscious: ____________________________________  Relationship to patient:  __________________________________________________________  Signature of Witness: _______________________________Date: _________Time: __________    Statement of Physician: My signature below affirms that prior to the time of the procedure, I have explained to the patient and/or his guardian, the risks and benefits involved in the proposed treatment and any reasonable alternative to the proposed treatment. I have also explained the risks and benefits involved in the refusal of the proposed treatment and have answered the patient's questions. If I have a significant financial interest in a co-management agreement or a significant financial interest in any product or implant, or other significant relationship used in the procedure/surgery, I have disclosed this and had a discussion with my patient.  Signature of Physician:   _________________________________Date:_____________Time:________    Patient Name: Jian RAMOS Andrés : 3/6/1945  Printed: 2024   Medical Record #: [id number]

## (undated) NOTE — ED AVS SNAPSHOT
Mr. Cummings [facility]   MRN: [id number]    Department:  [facility] Emergency Department   Date of Visit:  5/16/2018           Disclosure     Insurance plans vary and the physician(s) referred by the ER may not be covered by your plan.  Please conta within the next three months to obtain basic health screening including reassessment of your blood pressure.     IF THERE IS ANY CHANGE OR WORSENING OF YOUR CONDITION, CALL YOUR PRIMARY CARE PHYSICIAN AT ONCE OR RETURN IMMEDIATELY TO THE EMERGENCY DEPARTMEN

## (undated) NOTE — LETTER
[facility] ULTRASOUND  [address] [facility] [place] [address]  Authorization for Imaging Procedure  Date of Procedure:     I hereby authorize Dr. RASMUSSEN, my physician and his/her assistants (if applicable), which may include medical students, residents, and/or fellows, to perform the following procedure and administer such anesthesia as may be determined necessary by my physician: ULTRASOUND GUIDED RIGHT THORACENTESIS on Jian RAMOS Andrés.   2.  I recognize that during the procedure, unforeseen conditions may necessitate additional or different procedures than those listed above. I, therefore, further authorize and request that the above-named physician, assistants, or designees perform such procedures as are, in their judgment, necessary and desirable.    3.  My physician has discussed prior to my procedure the potential benefits, risks and side effects of this procedure; the likelihood of achieving goals; and potential problems that might occur during recuperation. They also discussed reasonable alternatives to the procedure, including risks, benefits, and side effects related to the alternatives and risks related to not receiving this procedure. I have had all my questions answered and I acknowledge that no guarantee has been made as to the result that may be obtained.    4.  Should the need arise during my procedure, which includes change of level of care prior to discharge, I also consent to the administration of blood and/or blood products. Further, I understand that despite careful testing and screening of blood or blood products by collecting agencies, I may still be subject to ill effects as a result of receiving a blood transfusion and/or blood products. The following are some, but not all, of the potential risks that can occur: fever and allergic reactions, hemolytic reactions, transmission of diseases such as Hepatitis, AIDS and Cytomegalovirus (CMV) and fluid overload. In the event  that I wish to have an autologous transfusion of my own blood, or a directed donor transfusion, I will discuss this with my physician.  Check only if Refusing Blood or Blood Products  I understand refusal of blood or blood products as deemed necessary by my physician may have serious consequences to my condition to include possible death. I hereby assume responsibility for my refusal and release the hospital, its personnel, and my physicians from any responsibility for the consequences of my refusal.   [  ] Patient Refuses Blood      5.  I authorize the use of any specimen, organs, tissues, body parts or foreign objects that may be removed from my body during the procedure for diagnosis, research or teaching purposes and their subsequent disposal by hospital authorities. I also authorize the release of specimen test results and/or written reports to my treating physician on the hospital medical staff or other referring or consulting physicians involved in my care, at the discretion of the Pathologist or my treating physician.    6.  I consent to the photographing or videotaping of the procedures to be performed, including appropriate portions of my body for medical, scientific, or educational purposes, provided my identity is not revealed by the pictures or by descriptive texts accompanying them. If the procedure has been photographed/videotaped, the physician will obtain the original picture, image, videotape or CD. The hospital will not be responsible for storage, release or maintenance of the picture, image, tape or CD.   7.  I consent to the presence of a  or observers in the operating room as deemed necessary by my physician or their designees.    8.  I recognize that in the event my procedure results in extended X-Ray/fluoroscopy time, I may develop a skin reaction.    9.  If I have a Do Not Attempt Resuscitation (DNAR) order in place, that status will be suspended while in the operating  room, procedural suite, and during the recovery period unless otherwise explicitly stated by me (or a person authorized to consent on my behalf). The performing physician or my attending physician will determine when the applicable recovery period ends for purposes of reinstating the DNAR order.  10.  I acknowledge that my physician has explained sedation/analgesia administration to me including the risk and benefits I consent to the administration of sedation/analgesia as may be necessary or desirable in the judgment of my physician.      I CERTIFY THAT I HAVE READ AND FULLY UNDERSTAND THE ABOVE CONSENT FOR THE PROCEDURE.   Signature of Patient: _____________________________________________________________  Responsible person in case of minor, unconscious: ____________________________________  Relationship to patient:  __________________________________________________________  Signature of Witness: _______________________________Date: _________Time: __________    Statement of Physician: My signature below affirms that prior to the time of the procedure, I have explained to the patient and/or his guardian, the risks and benefits involved in the proposed treatment and any reasonable alternative to the proposed treatment. I have also explained the risks and benefits involved in the refusal of the proposed treatment and have answered the patient's questions. If I have a significant financial interest in a co-management agreement or a significant financial interest in any product or implant, or other significant relationship used in the procedure/surgery, I have disclosed this and had a discussion with my patient.  Signature of Physician:   _________________________________Date:_____________Time:________    Patient Name: Jian A Andrés : 3/6/1945  Printed: 2024   Medical Record #: [id number]

## (undated) NOTE — ED AVS SNAPSHOT
Mr. Franklin Garcia   MRN: [id number]    Department:  [facility] Emergency Department   Date of Visit:  12/23/2018           Disclosure     Insurance plans vary and the physician(s) referred by the ER may not be covered by your plan.  Please cont within the next three months to obtain basic health screening including reassessment of your blood pressure.     IF THERE IS ANY CHANGE OR WORSENING OF YOUR CONDITION, CALL YOUR PRIMARY CARE PHYSICIAN AT ONCE OR RETURN IMMEDIATELY TO THE EMERGENCY DEPARTMEN

## (undated) NOTE — ED AVS SNAPSHOT
Mr. Flora Mckeon   MRN: [id number]    Department:  [facility] Emergency Department   Date of Visit:  9/30/2017           Disclosure     Insurance plans vary and the physician(s) referred by the ER may not be covered by your plan.  Please conta CARE PHYSICIAN AT ONCE OR RETURN IMMEDIATELY TO THE EMERGENCY DEPARTMENT. If you have been prescribed any medication(s), please fill your prescription right away and begin taking the medication(s) as directed.   If you believe that any of the medications